# Patient Record
Sex: FEMALE | Race: WHITE | HISPANIC OR LATINO | Employment: PART TIME | URBAN - METROPOLITAN AREA
[De-identification: names, ages, dates, MRNs, and addresses within clinical notes are randomized per-mention and may not be internally consistent; named-entity substitution may affect disease eponyms.]

---

## 2022-11-18 ENCOUNTER — PREP FOR PROCEDURE (OUTPATIENT)
Dept: GASTROENTEROLOGY | Facility: CLINIC | Age: 62
End: 2022-11-18

## 2022-11-18 ENCOUNTER — TELEPHONE (OUTPATIENT)
Dept: GASTROENTEROLOGY | Facility: CLINIC | Age: 62
End: 2022-11-18

## 2022-11-18 ENCOUNTER — OFFICE VISIT (OUTPATIENT)
Dept: FAMILY MEDICINE CLINIC | Facility: CLINIC | Age: 62
End: 2022-11-18

## 2022-11-18 VITALS
SYSTOLIC BLOOD PRESSURE: 158 MMHG | HEIGHT: 64 IN | WEIGHT: 209.2 LBS | OXYGEN SATURATION: 98 % | BODY MASS INDEX: 35.71 KG/M2 | DIASTOLIC BLOOD PRESSURE: 100 MMHG | TEMPERATURE: 97.4 F

## 2022-11-18 DIAGNOSIS — F43.10 PTSD (POST-TRAUMATIC STRESS DISORDER): ICD-10-CM

## 2022-11-18 DIAGNOSIS — I10 PRIMARY HYPERTENSION: Primary | ICD-10-CM

## 2022-11-18 DIAGNOSIS — Z12.11 SCREENING FOR COLORECTAL CANCER: ICD-10-CM

## 2022-11-18 DIAGNOSIS — Z86.010 HISTORY OF COLON POLYPS: Primary | ICD-10-CM

## 2022-11-18 DIAGNOSIS — Z13.1 SCREENING FOR DIABETES MELLITUS: ICD-10-CM

## 2022-11-18 DIAGNOSIS — Z13.29 SCREENING FOR THYROID DISORDER: ICD-10-CM

## 2022-11-18 DIAGNOSIS — Z13.0 SCREENING FOR IRON DEFICIENCY ANEMIA: ICD-10-CM

## 2022-11-18 DIAGNOSIS — Z13.220 SCREENING FOR LIPID DISORDERS: ICD-10-CM

## 2022-11-18 DIAGNOSIS — Z12.39 ENCOUNTER FOR BREAST CANCER SCREENING OTHER THAN MAMMOGRAM: ICD-10-CM

## 2022-11-18 DIAGNOSIS — Z11.4 SCREENING FOR HIV (HUMAN IMMUNODEFICIENCY VIRUS): ICD-10-CM

## 2022-11-18 DIAGNOSIS — Z12.12 SCREENING FOR COLORECTAL CANCER: ICD-10-CM

## 2022-11-18 DIAGNOSIS — Z11.59 ENCOUNTER FOR HEPATITIS C SCREENING TEST FOR LOW RISK PATIENT: ICD-10-CM

## 2022-11-18 RX ORDER — LISINOPRIL 5 MG/1
5 TABLET ORAL DAILY
Qty: 90 TABLET | Refills: 1 | Status: SHIPPED | OUTPATIENT
Start: 2022-11-18

## 2022-11-18 RX ORDER — MULTIVITAMIN/IRON/FOLIC ACID 18MG-0.4MG
1 TABLET ORAL DAILY
COMMUNITY

## 2022-11-18 NOTE — TELEPHONE ENCOUNTER
Scheduled date of colonoscopy (as of today): 1/18/23    Physician performing colonoscopy: Dr Hermann Fuentes    Location of colonoscopy: [facility]

## 2022-11-18 NOTE — PATIENT INSTRUCTIONS
Hipertensión   LO QUE NECESITA SABER:   La hipertensión es la presión arterial filiberto  La presión arterial es la fuerza que ejerce la alexsander al [employer] contra las alves de las arterias  La hipertensión causa que waite presión arterial se eleve tanto que waite corazón se ve forzado a trabajar mucho más mitch que lo normal  Souris puede dañar waite corazón  Puede que no se conozca la causa de la hipertensión  Souris se denomina hipertensión esencial o primaria  La hipertensión causada por otra afección médica, tales inocencio la enfermedad renal, se denomina hipertensión secundaria  INSTRUCCIONES SOBRE EL FILIBERTO HOSPITALARIA:   Llame al [employer] de emergencias (911 en [address] ) o pídale a alguien que llame si:  Usted tiene dolor en el pecho  Tiene alguno de los siguientes signos de un ataque cardíaco:      Estrujamiento, presión o tensión en waite pecho    Usted también podría presentar alguno de los siguientes:     Malestar o dolor en waite espalda, russell, mandíbula, abdomen, o brazo    Falta de [employer] o vómitos    Desvanecimiento o sudor frío repentino    Usted se siente confundido o tiene dificultad para hablar  Repentinamente se siente aturdido o con dificultad para respirar  Regrese a la heather de emergencias si:  Usted tiene un mitch dolor de jc o pérdida de la visión  Usted tiene debilidad en un brazo o en katheryn pierna  Llame a waite médico o cardiólogo si:  Usted se siente mareado, confundido, somnoliento o inocencio si se fuera a desmayar  Usted ha estado tomando medicamento para la presión arterial renny todavía está en un nivel más elevado del que waite médico le indicó que debería estar  Usted tiene preguntas o inquietudes acerca de waite condición o cuidado  Medicamentos: Es posible que usted necesite alguno de los siguientes:  Los antihipertensivos podrían usarse para ayudar a disminuir la presión arterial  Varios tipos de medicamentos están disponibles   Waite médico elegirá medicamentos basados en el tipo de hipertensión que tiene  Es posible que necesite más de un tipo de [place] el medicamento exactamente inocencio indicado  Los diuréticos ayudan a eliminar el exceso de líquido que se acumula en el organismo  [place] ayudará a bajar waite presión arterial  Es posible que orine más seguido mientras eliana marlene medicamento  Los medicamentos para el colesterol ayudan a bajar los niveles de [place]  Un nivel bajo de colesterol ayuda a prevenir enfermedades cardíacas y facilita el control de la presión arterial     [place] dena medicamentos inocencio se le haya indicado  Consulte con waite médico si usted [place] que waite medicamento no le está ayudando o si presenta efectos secundarios  Infórmele si es alérgico a cualquier medicamento  Mantenga katheryn lista actualizada de los [place], las vitaminas y los productos herbales que eliana  Incluya los siguientes datos de los medicamentos: cantidad, frecuencia y motivo de administración  Traiga con usted la lista o los envases de las píldoras a dena citas de seguimiento  Lleve la lista de los medicamentos con usted en bobbi de katheryn emergencia  Acuda a dena consultas de control con waite médico o cardiólogo según le indicaron: Usted necesitará regresar para medir waite presión arterial y realizar otros exámenes de laboratorio  Anote dena preguntas para que se acuerde de hacerlas danica dena visitas  [place] de la hipertensión:      Derotha Armida presión arterial normal es 119/79 o inferior   Waite médico podría comprobar waite presión arterial solamente cada año si se mantiene en un nivel normal     Katheryn presión arterial elevada es de 120/79 a 129/79   A veces esto se llama prehipertensión  Waite médico puede sugerir cambios de estilo de joanne para ayudar a bajar la presión arterial a un nivel normal  Entonces él podría comprobar waite presión otra vez en 3 a 6 meses  La etapa 1 de la hipertensión es de 130/80  a 139/89    Waite médico podría recomendar cambios de estilo de joanne, medicamentos y controles cada 3 a 6 meses hasta que waite presión arterial esté controlada  La etapa 2 de la hipertensión es de 140/90 o mayor   Waite médico le recomendará cambios en el estilo de joanne y le indicará 2 clases de medicamentos para la hipertensión  También necesitará controlar waite presión arterial cada mes hasta que esté controlada  Maneje waite hipertensión:  Controle waite presión arterial en casa  Evite fumar, consumir cafeína y hacer ejercicio al menos 30 minutos antes de controlar waite presión arterial  Siéntese y descanse por 5 minutos antes de tomarse la presión arterial  Extienda waite brazo y apóyelo en katheryn superficie plana  Waite brazo debe estar a la misma altura que waite corazón  Siga las instrucciones que vienen con el monitor para la presión arterial  Controle waite presión arterial 2 veces, con diferencia de 1 minuto, antes de haylee waite medicamento por la mañana  También controle waite presión arterial antes de la [place]  Mantenga un registro de waite peso y llévelo con usted a las citas de control  Pregúntele a waite médico cuál debería ser waite presión arterial          Controle cualquier otra condición médica que usted tenga  Algunas condiciones médicas inocencio la diabetes pueden aumentar waite riesgo de hipertensión  [address] waite médico y tómese dena medicamentos según dichas instrucciones  Pregunte [employer]  Ciertos medicamentos pueden aumentar waite presión arterial  Los ejemplos incluyen las píldoras anticonceptivas orales, los descongestivos, los suplementos herbales y los [place], inocencio el ibuprofeno  Waite médico puede indicarle qué medicamentos son seguros para usted  Estos medicamentos incluyen los recetados y de [place]  Cambios de estilo de joanne que usted puede hacer para manejar la hipertensión: Waite médico puede recomendarle que trabaje con un equipo para controlar la hipertensión  El equipo puede incluir expertos médicos, inocencio un dietista, un fisioterapeuta o un terapeuta del comportamiento   Los miembros de waite clara pueden participar en la creación de cambios en el estilo de joanne  Limite el sodio (la sal) inocencio se le haya indicado  Demasiado sodio puede afectar el equilibrio de líquidos  Revise las etiquetas para buscar alimentos bajos en sodio o sin sal agregada  Algunos alimentos bajos en sodio utilizan sales de potasio para añadir sabor  Demasiado potasio también puede causar problemas de [place]  Waite médico le dirá qué cantidad de sodio y potasio es martin para el consumo en un día  Puede recomendarle que limite el sodio a 2,300 mg al día  Siga el plan de comidas recomendado por waite médico  Un dietista o médico puede darle más información sobre planes de bajo contenido de sodio o el plan de alimentación DASH (enfoques dietéticos para detener la hipertensión)  El plan DASH es bajo en sodio, azúcar procesada, grasas dañinas y grasas totales  Es alto en potasio, calcio y [place]  Estos se encuentran en las verduras, las frutas y los alimentos integrales  Manténgase físicamente activo danica todo el día  La actividad física, inocencio el ejercicio, puede ayudar a controlar waite presión arterial y waite peso  Jim actividad física por lo menos 30 minutos al día, la mayoría de los días de la [place]  Incluya katheryn actividad aeróbica, inocencio caminar o montar en bicicleta  Incluya también entrenamiento de fuerza al menos 2 veces por semana  Pau médicos pueden ayudarle a crear un plan de Carry Radha  [id number]  Es posible que esto contribuya a bajar waite presión arterial  Aprenda sobre formas de [place], inocencio respiración profunda o escuchar música  Limite el consumo de alcohol según le indicaron  El alcohol puede aumentar la presión arterial  Un trago equivale a 12 onzas de cerveza, 5 onzas de vino o 1 onza y ½ de licor  No fume  La nicotina y otros químicos en los cigarrillos y cigarros pueden aumentar waite presión arterial y también provocar daño al pulmón   [place] a waite médico si usted actualmente fuma y necesita ayuda para dejar de fumar  Los cigarrillos electrónicos o el tabaco sin humo igualmente contienen nicotina  Consulte con waite médico antes de [employer]  © Copyright [facility] 2022 Information is for End User's use only and may not be sold, redistributed or otherwise used for commercial purposes  All illustrations and images included in CareNotes® are the copyrighted property of A D A M , [employer]  or [address] [place] [address] es sólo para uso en educación  Waite intención no es darle un consejo médico sobre enfermedades o tratamientos  Colsulte con waite Ronalee Mon farmacéutico antes de seguir cualquier régimen médico para saber si es seguro y efectivo para usted
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
## 2022-11-20 PROBLEM — F43.10 PTSD (POST-TRAUMATIC STRESS DISORDER): Status: ACTIVE | Noted: 2022-11-20

## 2022-12-01 LAB
EXTERNAL HIV SCREEN: NORMAL
HBA1C MFR BLD HPLC: 5.6 %
HCV AB SER-ACNC: NEGATIVE

## 2022-12-07 ENCOUNTER — TELEPHONE (OUTPATIENT)
Dept: FAMILY MEDICINE CLINIC | Facility: CLINIC | Age: 62
End: 2022-12-07

## 2022-12-07 NOTE — TELEPHONE ENCOUNTER
Patient called to return Dr Jose Alicia phone call  Dr Jose Alicia is in with another patient, told patient that she would call back today but leaving at 12:00 PM or tomorrow morning to review lab results

## 2022-12-13 ENCOUNTER — TELEPHONE (OUTPATIENT)
Dept: FAMILY MEDICINE CLINIC | Facility: CLINIC | Age: 62
End: 2022-12-13

## 2022-12-13 NOTE — TELEPHONE ENCOUNTER
After review of your lab work, overall things look stable  Blood counts, kidney, liver and thyroid function are normal  The Hep C and HIV are negative  Hemoglobin A1C 5 6, no prediabetes or diabetes  Cholesterol levels were elevated, however, patient may continue with lifestyle modifications at this time  No need for medications

## 2022-12-21 ENCOUNTER — OFFICE VISIT (OUTPATIENT)
Dept: FAMILY MEDICINE CLINIC | Facility: CLINIC | Age: 62
End: 2022-12-21

## 2022-12-21 VITALS
SYSTOLIC BLOOD PRESSURE: 142 MMHG | HEART RATE: 78 BPM | WEIGHT: 207.4 LBS | DIASTOLIC BLOOD PRESSURE: 86 MMHG | BODY MASS INDEX: 35.41 KG/M2 | HEIGHT: 64 IN | TEMPERATURE: 97.7 F | OXYGEN SATURATION: 98 %

## 2022-12-21 DIAGNOSIS — I10 PRIMARY HYPERTENSION: Primary | ICD-10-CM

## 2022-12-21 DIAGNOSIS — Z23 ENCOUNTER FOR VACCINATION: ICD-10-CM

## 2022-12-21 DIAGNOSIS — L23.9 ALLERGIC DERMATITIS: ICD-10-CM

## 2022-12-21 RX ORDER — TRIAMCINOLONE ACETONIDE 0.25 MG/G
CREAM TOPICAL 2 TIMES DAILY
Qty: 30 G | Refills: 0 | Status: SHIPPED | OUTPATIENT
Start: 2022-12-21

## 2022-12-21 NOTE — PROGRESS NOTES
Assessment/Plan:    No problem-specific Assessment & Plan notes found for this encounter  Diagnoses and all orders for this visit:    Primary hypertension  - Patient reports compliance with lisinopril 5mg and has some improvement in BP, not yet at goal < 140/80  - PCP counseled on continued LM modification and importance of daily exercise ie 10-15 mins of walking     Encounter for vaccination  -     influenza vaccine, quadrivalent, recombinant, PF, 0 5 mL, for patients 18 yr+ (FLUBLOK)    Allergic dermatitis  - Patient with recent exposure x 2 weeks ago to flower in [place] after which she developed a rash and multiple hives  She did trial OTC steroid cream which helped her symptoms  - Patient to continue with triamcinolone for complete resolution of rash      - triamcinolone (KENALOG) 0 025 % cream; Apply topically 2 (two) times a day        Subjective:      Patient ID: Louise Henriquez is a 65 y o  female  Hypertension  This is a chronic problem  The current episode started more than 1 year ago  The problem is uncontrolled  Pertinent negatives include no blurred vision, chest pain, palpitations or peripheral edema  There are no associated agents to hypertension  Risk factors for coronary artery disease include dyslipidemia, obesity, sedentary lifestyle and post-menopausal state  Past treatments include ACE inhibitors  The current treatment provides moderate improvement  There are no compliance problems  There is no history of heart failure  There is no history of a thyroid problem  Rash  The current episode started 1 to 4 weeks ago  The problem has been gradually improving since onset  The affected locations include the chest  The problem is mild  The rash is characterized by blistering and itchiness  Associated with: Patient reports exposure to an unknown flower in [place] during which time she developed multiple blisters/hives and a rash, The rash first occurred at another residence   Pertinent negatives include no fever or sore throat  Past treatments include topical steroids  The treatment provided moderate relief  The following portions of the patient's history were reviewed and updated as appropriate: allergies, current medications, past family history, past medical history, past social history, past surgical history and problem list     Review of Systems   Constitutional: Negative for fever  HENT: Negative for sore throat  Eyes: Negative for blurred vision  Cardiovascular: Negative for chest pain and palpitations  Skin: Positive for rash  Objective:      /86 (BP Location: Left arm)   Pulse 78   Temp 97 7 °F (36 5 °C) (Tympanic)   Ht 5' 4" (1 626 m)   Wt 94 1 kg (207 lb 6 4 oz)   SpO2 98%   BMI 35 60 kg/m²          Physical Exam  Constitutional:       Appearance: She is well-developed  HENT:      Head: Normocephalic and atraumatic  Nose: Nose normal    Eyes:      Conjunctiva/sclera: Conjunctivae normal    Cardiovascular:      Rate and Rhythm: Normal rate and regular rhythm  Heart sounds: Normal heart sounds  Pulmonary:      Effort: Pulmonary effort is normal       Breath sounds: Normal breath sounds  Chest:      Comments: Multiple pin point blisters noted at the lateral border of the right axilla  Abdominal:      General: Bowel sounds are normal       Palpations: Abdomen is soft  Musculoskeletal:      Cervical back: Normal range of motion and neck supple  Skin:     General: Skin is warm and dry  Neurological:      Mental Status: She is alert and oriented to person, place, and time

## 2022-12-28 ENCOUNTER — TELEPHONE (OUTPATIENT)
Dept: FAMILY MEDICINE CLINIC | Facility: CLINIC | Age: 62
End: 2022-12-28

## 2022-12-28 NOTE — TELEPHONE ENCOUNTER
Kayleigh stopped by to inquire about the (disability?) paperwork she presented at her last visit  I did let her know that Dr usually takes about 5 days to review paperwork and give a date for patient   She wanted to make sure it was the correct form or if a different one was needed  Is there an update available I could share with patient?

## 2022-12-29 NOTE — TELEPHONE ENCOUNTER
Called patient after 's review of paperwork and relayed the information-- patient does not qualify for disability and she is not a [place] citizen  Patient stated that she understood

## 2023-01-06 ENCOUNTER — TELEPHONE (OUTPATIENT)
Dept: FAMILY MEDICINE CLINIC | Facility: CLINIC | Age: 63
End: 2023-01-06

## 2023-01-06 NOTE — TELEPHONE ENCOUNTER
Asked patient to provide a call back, so we can confirm which mental health counseling facility to send her records request to  Last Friday Mrs Luis Felipe Ceron stopped in and asked us to request records; however, she was unable to confirm at the time where to send the requests to  I asked her to confirm and give us a call back, but since we haven't heard back this week I reached out to follow up  Patient called back and mentioned she had forgotten to confirm the facility  She stated she had to recheck her documentation to get the correct information (name and phone or address)  She said she would stop in with the correct information

## 2023-01-09 ENCOUNTER — TELEPHONE (OUTPATIENT)
Dept: GASTROENTEROLOGY | Facility: CLINIC | Age: 63
End: 2023-01-09

## 2023-03-03 ENCOUNTER — TELEPHONE (OUTPATIENT)
Dept: FAMILY MEDICINE CLINIC | Facility: CLINIC | Age: 63
End: 2023-03-03

## 2023-03-03 NOTE — TELEPHONE ENCOUNTER
Called to reschedule 3/22 appointment due to appointment length error  Offered a 3/23 1 pm appointment or a different appointment that better fits her schedule   Requested a call back to reschedule appointment at [phone]

## 2023-03-21 NOTE — TELEPHONE ENCOUNTER
Called again to confirm or reschedule 3/22 appointment  However, patient did not answer, nor did spouse   Requested a call back to discuss appointment at [phone] option 1

## 2023-05-22 ENCOUNTER — OFFICE VISIT (OUTPATIENT)
Dept: FAMILY MEDICINE CLINIC | Facility: CLINIC | Age: 63
End: 2023-05-22

## 2023-05-22 VITALS
BODY MASS INDEX: 35.34 KG/M2 | WEIGHT: 207 LBS | SYSTOLIC BLOOD PRESSURE: 128 MMHG | HEIGHT: 64 IN | HEART RATE: 88 BPM | TEMPERATURE: 97.5 F | DIASTOLIC BLOOD PRESSURE: 80 MMHG | OXYGEN SATURATION: 97 %

## 2023-05-22 DIAGNOSIS — Z00.00 ANNUAL PHYSICAL EXAM: Primary | ICD-10-CM

## 2023-05-22 NOTE — PROGRESS NOTES
ADULT ANNUAL PHYSICAL  [facility] PRIMARY CARE    NAME: Melchor Fleischer  AGE: 59 y o  SEX: female  : 1960     DATE: 2023     Assessment and Plan:     Problem List Items Addressed This Visit    None  Visit Diagnoses     Annual physical exam    -  Primary  -She reports colonoscopy completed approximately 5 years ago PCP/office staff to reach out for pathology report  -Patient reports she completed cervical cancer screening in , care everywhere showing order on 2020  However full report is not available, will reach out to for complete results   -Patient due for mammography order has already been placed, patient to schedule as soon as possible    Relevant Orders    CBC and differential    Comprehensive metabolic panel    Lipid panel    Hemoglobin A1C          Immunizations and preventive care screenings were discussed with patient today  Appropriate education was printed on patient's after visit summary  Counseling:  Alcohol/drug use: discussed moderation in alcohol intake, the recommendations for healthy alcohol use, and avoidance of illicit drug use  Dental Health: discussed importance of regular tooth brushing, flossing, and dental visits  Injury prevention: discussed safety/seat belts, safety helmets, smoke detectors, carbon dioxide detectors, and smoking near bedding or upholstery  Sexual health: discussed sexually transmitted diseases, partner selection, use of condoms, avoidance of unintended pregnancy, and contraceptive alternatives  · Exercise: the importance of regular exercise/physical activity was discussed  Recommend exercise 3-5 times per week for at least 30 minutes  BMI Counseling: Body mass index is 35 53 kg/m²  The BMI is above normal  Nutrition recommendations include encouraging healthy choices of fruits and vegetables  Exercise recommendations include moderate physical activity 150 minutes/week   Rationale for BMI follow-up plan is due to patient being overweight or obese  Return in about 6 months (around 11/22/2023) for HTN recheck  Chief Complaint:     Chief Complaint   Patient presents with   • Physical Exam      History of Present Illness:     Adult Annual Physical   Patient here for a comprehensive physical exam  The patient reports no problems  Diet and Physical Activity  · Diet/Nutrition: Patient reports she is currently doing intermittent fasting  · Exercise: no formal exercise  Depression Screening  PHQ-2/9 Depression Screening         General Health  · Sleep: sleeps poorly and Patient reports she has experienced worsening anxiety over the last 1 to 2 weeks as she recently had a falling out with her daughter-in-law  PCP counseled patient on anxiety reducing techniques      · Hearing: normal - bilateral   · Vision: most recent eye exam <1 year ago and wears glasses  · Dental: no dental visits for >1 year and brushes teeth once daily  /GYN Health  · Patient is: postmenopausal  · Contraceptive method: N/A  Review of Systems:     Review of Systems   Constitutional: Negative for chills and fever  HENT: Negative for hearing loss  Eyes: Negative for visual disturbance  Respiratory: Negative for chest tightness and shortness of breath  Cardiovascular: Negative for chest pain and palpitations  Gastrointestinal: Negative for nausea and vomiting  Genitourinary: Negative for difficulty urinating and dysuria  Musculoskeletal: Negative for gait problem  Skin: Negative for rash  Neurological: Negative for light-headedness and headaches        Past Medical History:     Past Medical History:   Diagnosis Date   • Depression    • Hypertension       Past Surgical History:     Past Surgical History:   Procedure Laterality Date   • BREAST SURGERY      8 years ago, biopsy which was benign      Social History:     Social History     Socioeconomic History   • Marital status: "/[employer]     Spouse name: None   • Number of children: None   • Years of education: None   • Highest education level: None   Occupational History   • None   Tobacco Use   • Smoking status: Never   • Smokeless tobacco: Never   Vaping Use   • Vaping Use: Never used   Substance and Sexual Activity   • Alcohol use: Not Currently   • Drug use: Never   • Sexual activity: Yes     Partners: Male   Other Topics Concern   • None   Social History Narrative   • None     Social Determinants of Health     Financial Resource Strain: Not on file   Food Insecurity: Not on file   Transportation Needs: Not on file   Physical Activity: Not on file   Stress: Not on file   Social Connections: Not on file   Intimate Partner Violence: Not on file   Housing Stability: Not on file      Family History:     Family History   Problem Relation Age of Onset   • Depression Mother    • Depression Father    • Depression Brother    • Diabetes Brother       Current Medications:     Current Outpatient Medications   Medication Sig Dispense Refill   • lisinopril (ZESTRIL) 5 mg tablet Take 1 tablet (5 mg total) by mouth daily 90 tablet 1   • Multiple Vitamins-Minerals (EQ Complete Multivit Adult 50+) TABS Take 1 tablet by mouth daily     • triamcinolone (KENALOG) 0 025 % cream Apply topically 2 (two) times a day 30 g 0     No current facility-administered medications for this visit  Allergies:     No Known Allergies   Physical Exam:     /80   Pulse 88   Temp 97 5 °F (36 4 °C)   Ht 5' 4\" (1 626 m)   Wt 93 9 kg (207 lb)   SpO2 97%   BMI 35 53 kg/m²     Physical Exam  Constitutional:       Appearance: She is well-developed  HENT:      Head: Normocephalic and atraumatic  Right Ear: External ear normal       Left Ear: External ear normal       Mouth/Throat:      Mouth: Mucous membranes are moist       Pharynx: Oropharynx is clear     Eyes:      Conjunctiva/sclera: Conjunctivae normal    Cardiovascular:      Rate and Rhythm: " Normal rate and regular rhythm  Heart sounds: Normal heart sounds  Pulmonary:      Effort: Pulmonary effort is normal       Breath sounds: Normal breath sounds  Abdominal:      General: Bowel sounds are normal       Palpations: Abdomen is soft  Musculoskeletal:      Cervical back: Normal range of motion and neck supple  Skin:     General: Skin is warm and dry  Neurological:      Mental Status: She is alert and oriented to person, place, and time            MD Simón Aldrich [place] [id number] PRIMARY CARE

## 2023-05-23 LAB — HBA1C MFR BLD HPLC: 5.4 %

## 2023-06-02 ENCOUNTER — TELEPHONE (OUTPATIENT)
Dept: FAMILY MEDICINE CLINIC | Facility: CLINIC | Age: 63
End: 2023-06-02

## 2023-06-02 NOTE — TELEPHONE ENCOUNTER
----- Message from Thomas Jones MD sent at 6/1/2023  5:31 PM EDT -----  Regarding: Labs  Hi Ladies,    Can we please call patient and inform her of the below:    After review of your lab work, overall things look stable  Blood counts, kidney, and liver function are normal  =Cholesterol levels look great, as well as, the Hemoglobin A1c at 5 4 which indicates she does not have prediabetes or diabetes at this time  Please let me know if you have any questions or concerns        Thanks,  August Forward

## 2023-07-13 ENCOUNTER — TELEPHONE (OUTPATIENT)
Dept: GASTROENTEROLOGY | Facility: MEDICAL CENTER | Age: 63
End: 2023-07-13

## 2023-07-13 DIAGNOSIS — Z12.39 ENCOUNTER FOR BREAST CANCER SCREENING OTHER THAN MAMMOGRAM: ICD-10-CM

## 2023-07-13 NOTE — TELEPHONE ENCOUNTER
I called patient off of cancellation list. Patient failed OA - wants to see a provider first. Agreeable to September OV.

## 2023-07-18 NOTE — RESULT ENCOUNTER NOTE
Hi Ladies,    Can we please call patient and relay the following:    Good Morning Mrs. Nicole Slaughter,    Please note that your mammogram did not show any masses changes concerning for breast cancer at this time. You may continue routine screening in 1 year.     Steven Chaparro  ____________________________________________________________________

## 2023-07-21 ENCOUNTER — TELEPHONE (OUTPATIENT)
Dept: FAMILY MEDICINE CLINIC | Facility: CLINIC | Age: 63
End: 2023-07-21

## 2023-07-21 NOTE — TELEPHONE ENCOUNTER
Called patient's spouse for his results and asked if I could share patient's. He agreed. I relayed the information below and asked when her next appointment was, 11/21/23. Patient may stop in to schedule another appointment sooner. ----- Message from Arlene Eddy MD sent at 7/17/2023  9:31 PM EDT -----  Julissa Head Ladies,    Can we please call patient and relay the following:    Good Morning Mrs. Nuzhat Gayle,    Please note that your mammogram did not show any masses changes concerning for breast cancer at this time. You may continue routine screening in 1 year.     Monica Montanez  ____________________________________________________________________

## 2023-08-08 ENCOUNTER — TELEPHONE (OUTPATIENT)
Dept: ADMINISTRATIVE | Facility: OTHER | Age: 63
End: 2023-08-08

## 2023-08-08 NOTE — LETTER
Procedure Request Form: Colonoscopy      Date Requested: 23  Patient: Akron Milling  Patient : 1960   Referring Provider: Corky Lim, MD        Date of Procedure ______________________________       The above patient has informed us that they have completed their   most recent Colonoscopy at your facility. Please complete   this form and attach all corresponding procedure reports/results. Comments __________________________________________________________  ____________________________________________________________________  ____________________________________________________________________  ____________________________________________________________________    Facility Completing Procedure _________________________________________    Form Completed By (print name) _______________________________________      Signature __________________________________________________________      These reports are needed for  compliance. Please fax this completed form and a copy of the procedure report to our office located at [address] [place] [address] as soon as possible to Fax [phone] [place]: Phone [phone]    We thank you for your assistance in treating our mutual patient.

## 2023-08-08 NOTE — TELEPHONE ENCOUNTER
Upon review of the In Basket request we were able to locate, review, and update the patient chart as requested for CRC: Colonoscopy. Any additional questions or concerns should be emailed to the Practice Liaisons via the appropriate education email address, please do not reply via In Basket.     Thank you  Brionna Arroyo MA

## 2023-08-08 NOTE — TELEPHONE ENCOUNTER
----- Message from Lennox Delude sent at 8/7/2023  4:06 PM EDT -----  Regarding: Care Gap Request  08/07/23 4:06 PM    Hello, our patient attached above has had CRC: Colonoscopy completed/performed. Please assist in updating the patient chart by making an External outreach to Dr Amaya Boothe  facility located in [place] . The date of service is between 2016- 2018 .     Thank you,  Lennox Delude PG [place]

## 2023-08-08 NOTE — TELEPHONE ENCOUNTER
Upon review of the In Basket request and the patient's chart, initial outreach has been made via fax to facility. Please see Contacts section for details.      Thank you  Lisa Shipley MA

## 2023-10-18 ENCOUNTER — TELEPHONE (OUTPATIENT)
Dept: FAMILY MEDICINE CLINIC | Facility: CLINIC | Age: 63
End: 2023-10-18

## 2023-10-19 ENCOUNTER — OFFICE VISIT (OUTPATIENT)
Dept: FAMILY MEDICINE CLINIC | Facility: CLINIC | Age: 63
End: 2023-10-19
Payer: COMMERCIAL

## 2023-10-19 VITALS
WEIGHT: 206.4 LBS | HEIGHT: 64 IN | DIASTOLIC BLOOD PRESSURE: 92 MMHG | TEMPERATURE: 96.9 F | OXYGEN SATURATION: 98 % | HEART RATE: 74 BPM | BODY MASS INDEX: 35.24 KG/M2 | SYSTOLIC BLOOD PRESSURE: 126 MMHG

## 2023-10-19 DIAGNOSIS — Z12.4 CERVICAL CANCER SCREENING: ICD-10-CM

## 2023-10-19 DIAGNOSIS — R41.9 COGNITIVE COMPLAINTS: Primary | ICD-10-CM

## 2023-10-19 PROCEDURE — S0610 ANNUAL GYNECOLOGICAL EXAMINA: HCPCS | Performed by: FAMILY MEDICINE

## 2023-10-19 PROCEDURE — 99215 OFFICE O/P EST HI 40 MIN: CPT | Performed by: FAMILY MEDICINE

## 2023-10-19 PROCEDURE — G0145 SCR C/V CYTO,THINLAYER,RESCR: HCPCS | Performed by: FAMILY MEDICINE

## 2023-10-19 PROCEDURE — G0476 HPV COMBO ASSAY CA SCREEN: HCPCS | Performed by: FAMILY MEDICINE

## 2023-10-19 NOTE — PROGRESS NOTES
Assessment/Plan:    No problem-specific Assessment & Plan notes found for this encounter. Diagnoses and all orders for this visit:    Cognitive complaints  -   Patient reports concern over the last 2-3 years of memory changes and difficulty with learning new information resulting in some difficulties with IADLs  -PCP completed MoCA testing with results of 14/30, after further discussion with patient PCP has agreed to refer patient for further cognitive testing  - Ambulatory Referral to Neuropsychology; Future    Cervical cancer screening  -   Await pathology results  -Liquid-based pap, screening          Subjective:      Patient ID: Marielos Amador is a 59 y.o. female.     Patient presents to discuss concern of memory changes and difficulty learning new information, as well as, gynecologic exam  Patient reports that over the last 2 to 3 years she has been experiencing significant memory changes i.e. difficulty remembering conversations with family members, remembering instructions given to her, and completing some IADLs ie paying her bills  Patient denies ever getting lost after leaving the home or wandering her neighborhood  No family member present with patient to relay any concerns regarding cognition  Patient does report that she completed the ninth grade in the [place] but no further education thereafter  Patient reports concern regarding inability to process new information and requested further cognitive testing with PCP  Given the above, PCP has agreed to administer the [facility] Cognitive Assessment test today  PCP administered test in Georgian which is patient's primary language, overall school all 14/30 noted  PCP discussed results with patient and at this time would recommend follow-up with Neuropsychology to assess for underlying cognitive decline          The following portions of the patient's history were reviewed and updated as appropriate: allergies, current medications, past family history, past medical history, past social history, past surgical history, and problem list.    Review of Systems   Psychiatric/Behavioral:  Positive for decreased concentration. Memory changes         Objective:      /92   Pulse 74   Temp (!) 96.9 °F (36.1 °C)   Ht 5' 4" (1.626 m)   Wt 93.6 kg (206 lb 6.4 oz)   SpO2 98%   BMI 35.43 kg/m²          Physical Exam  Constitutional:       Appearance: Normal appearance. HENT:      Head: Normocephalic and atraumatic. Right Ear: External ear normal.      Left Ear: External ear normal.   Eyes:      Conjunctiva/sclera: Conjunctivae normal.   Genitourinary:     General: Normal vulva. Vagina: Normal.      Cervix: Normal.      Uterus: Normal.       Adnexa: Right adnexa normal and left adnexa normal.          Comments: Right laceration of vulva (from shaving per patient)  Neurological:      Mental Status: She is alert and oriented to person, place, and time.

## 2023-10-20 LAB
HPV HR 12 DNA CVX QL NAA+PROBE: NEGATIVE
HPV16 DNA CVX QL NAA+PROBE: NEGATIVE
HPV18 DNA CVX QL NAA+PROBE: NEGATIVE

## 2023-10-25 LAB
LAB AP GYN PRIMARY INTERPRETATION: NORMAL
Lab: NORMAL

## 2023-10-30 ENCOUNTER — TELEPHONE (OUTPATIENT)
Dept: FAMILY MEDICINE CLINIC | Facility: CLINIC | Age: 63
End: 2023-10-30

## 2023-10-30 NOTE — TELEPHONE ENCOUNTER
----- Message from Cecilia Knapp MD sent at 10/30/2023  9:13 AM EDT -----  Hi Ladies,    Can we please call patient and inform her of the below:    Good Morning Mrs. Kathie Vyas,    Please note that your papsmear was negative for any tissue changes concerning for cancer and your HPV panel was negative as well. You may continue with routine screening for HPV and papsmear every 5 years. Please reach out with any questions or concerns.     Joana Randolph  ___________________________________________________________________________

## 2023-10-31 ENCOUNTER — TELEPHONE (OUTPATIENT)
Dept: FAMILY MEDICINE CLINIC | Facility: CLINIC | Age: 63
End: 2023-10-31

## 2023-10-31 NOTE — TELEPHONE ENCOUNTER
----- Message from Dominick Daley MD sent at 10/30/2023  9:13 AM EDT -----  Hi Ladies,    Can we please call patient and inform her of the below:    Good Morning Mrs. Ingrid Jacome,    Please note that your papsmear was negative for any tissue changes concerning for cancer and your HPV panel was negative as well. You may continue with routine screening for HPV and papsmear every 5 years. Please reach out with any questions or concerns.     Josh Devine  ___________________________________________________________________________

## 2024-01-17 ENCOUNTER — TELEPHONE (OUTPATIENT)
Dept: PSYCHIATRY | Facility: CLINIC | Age: 64
End: 2024-01-17

## 2024-01-17 NOTE — TELEPHONE ENCOUNTER
"Reached out to patient in regards to Neuropsychology referral.    Unable to LVM for pt to contact intake department automated message comes on \"the person you are trying to reach is not receiving calls at this time\".   "

## 2024-01-18 ENCOUNTER — TELEPHONE (OUTPATIENT)
Dept: FAMILY MEDICINE CLINIC | Facility: CLINIC | Age: 64
End: 2024-01-18

## 2024-01-18 NOTE — TELEPHONE ENCOUNTER
Called patient multiple times to reschedule her visit for tomorrow 1/19/2024 Virtual, she did not answer and unable to leave a voicemail.

## 2024-01-24 NOTE — TELEPHONE ENCOUNTER
"Reached out to patient in regards to Neuropsychology referral.    Automated message states \"person you care trying to call cannot accept calls at this time\". Unable to leave VM.    "

## 2024-02-07 ENCOUNTER — TELEPHONE (OUTPATIENT)
Dept: FAMILY MEDICINE CLINIC | Facility: CLINIC | Age: 64
End: 2024-02-07

## 2024-02-07 ENCOUNTER — TELEPHONE (OUTPATIENT)
Dept: NEUROSURGERY | Facility: CLINIC | Age: 64
End: 2024-02-07

## 2024-02-07 DIAGNOSIS — R41.9 COGNITIVE COMPLAINTS: Primary | ICD-10-CM

## 2024-02-07 NOTE — TELEPHONE ENCOUNTER
Patient called, she asked if the doctor could make a referral for a neurologist because they called to make an appointment with a neuropsychology and they told her that they do not accept referrals from a primary doctor.

## 2024-02-07 NOTE — TELEPHONE ENCOUNTER
Called patient to let her know that the referral has been placed. She didn't answer I left a voicemail.

## 2024-03-17 NOTE — TELEPHONE ENCOUNTER
07/13/23  Screened by: Elias Aden    Referring Provider Anthony    Pre- Screening: There is no height or weight on file to calculate BMI. Has patient been referred for a routine screening Colonoscopy? yes  Is the patient between 43-73 years old? yes      Previous Colonoscopy yes   If yes:    Date: 5 years ago    Facility: Unknown    Reason: Screening      SCHEDULING STAFF: If the patient is between 39yrs-51yrs, please advise patient to confirm benefits/coverage with their insurance company for a routine screening colonoscopy, some insurance carriers will only cover at [address] [place] [address] or older. If the patient is over 66years old, please schedule an office visit. Does the patient want to see a Gastroenterologist prior to their procedure OR are they having any GI symptoms? yes    Has the patient been hospitalized or had abdominal surgery in the past 6 months? no    Does the patient use supplemental oxygen? no    Does the patient take Coumadin, Lovenox, Plavix, Elliquis, Xarelto, or other blood thinning medication? no    Has the patient had a stroke, cardiac event, or stent placed in the past year? no    SCHEDULING STAFF: If patient answers NO to above questions, then schedule procedure. If patient answers YES to above questions, then schedule office appointment. If patient is between 45yrs - 49yrs, please advise patient that we will have to confirm benefits & coverage with their insurance company for a routine screening colonoscopy. 1 person assist

## 2024-04-16 ENCOUNTER — OFFICE VISIT (OUTPATIENT)
Dept: URGENT CARE | Facility: CLINIC | Age: 64
End: 2024-04-16
Payer: COMMERCIAL

## 2024-04-16 ENCOUNTER — APPOINTMENT (OUTPATIENT)
Dept: RADIOLOGY | Facility: CLINIC | Age: 64
End: 2024-04-16
Payer: COMMERCIAL

## 2024-04-16 VITALS
OXYGEN SATURATION: 98 % | RESPIRATION RATE: 18 BRPM | HEART RATE: 58 BPM | TEMPERATURE: 97.6 F | DIASTOLIC BLOOD PRESSURE: 85 MMHG | SYSTOLIC BLOOD PRESSURE: 142 MMHG

## 2024-04-16 DIAGNOSIS — M25.572 ARTHRALGIA OF ANKLE, LEFT: Primary | ICD-10-CM

## 2024-04-16 DIAGNOSIS — M25.472 LEFT ANKLE SWELLING: ICD-10-CM

## 2024-04-16 PROCEDURE — 73610 X-RAY EXAM OF ANKLE: CPT

## 2024-04-16 PROCEDURE — 99203 OFFICE O/P NEW LOW 30 MIN: CPT | Performed by: PHYSICIAN ASSISTANT

## 2024-04-16 RX ORDER — MELOXICAM 15 MG/1
15 TABLET ORAL DAILY
Qty: 10 TABLET | Refills: 0 | Status: SHIPPED | OUTPATIENT
Start: 2024-04-16 | End: 2024-04-26

## 2024-04-16 NOTE — PROGRESS NOTES
[facility]        NAME: Kayleigh Harry is a 64 y.o. female  : 1960    MRN: [id number]  DATE: 2024  TIME: 3:12 PM    Assessment and Plan   Arthralgia of ankle, left [M25.572]  1. Arthralgia of ankle, left  meloxicam (Mobic) 15 mg tablet      2. Left ankle swelling  XR ankle 3+ vw left            Patient Instructions     Patient Instructions   Artralgia   LO QUE NECESITA SABER:   La artralgia es dolor en katheryn o más articulaciones, renny sin inflamación. El dolor podría ser de corta duración y mejorar dentro de 6 a 8 semanas. La artralgia puede ser katheryn señal temprana de artritis. La artralgia puede ser causada por katheryn condición médica inocencio un trastorno hormonal o un tumor. Además podría ser la consecuencia de katheryn infección o lesión.  INSTRUCCIONES SOBRE EL FILIBERTO HOSPITALARIA:   Medicamentos: A usted le pueden  prescribir los siguientes medicamentos:  Acetaminofén gerard el dolor. Pregunte la cantidad y la frecuencia con que debe tomarlos. Siga las indicaciones. El acetaminofén puede causar daño en el hígado cuando no se eliana de forma correcta.    NAT disminuyen el dolor y previenen la inflamación. Consulte con waite médico cuál medicamento es el adecuado para usted. Pregunte cuánto haylee y cuándo. Tómelos inocencio se le indique. Cuando no se michael de la manera indicada, los medicamentos antiinflamatorios no esteroides pueden causar sangrado estomacal y problemas renales.    La crema para el alivio del dolor gerard el dolor. Usela según las indicaciones dadas.    [place] dena medicamentos inocencio se le haya indicado. Consulte con waite médico si usted shelby que waite medicamento no le está ayudando o si presenta efectos secundarios. Infórmele al médico si usted es alérgico a algún medicamento. Mantenga katheryn lista actualizada de los medicamentos, las vitaminas y los productos herbales que eliana. Incluya los siguientes datos de los medicamentos: cantidad, frecuencia y motivo de administración. Traiga con usted la  lista o los envases de las píldoras a dena citas de seguimiento. Lleve la lista de los medicamentos con usted en bobbi de katheryn emergencia.    Jim katheryn rinku de control con waite médico o especialista inocencio se lo indicaron: Anote dena preguntas para que se acuerde de hacerlas danica dena visitas.  Cuidados personales:  Aplique calor para ayudar a reducir el dolor. Use katheryn compresa o envoltura caliente. Aplíquese calor de 20 a 30 minutos cada 2 horas danica los días que le indiquen.    Descanse lo más posible. Evite actividades que causan dolor en las articulaciones.    Aplique hielo para ayudar a bajar la inflamación y el dolor. El hielo también puede contribuir a evitar el daño de los tejidos. Use katheryn compresa de hielo o ponga hielo triturado en katheryn bolsa de plástico. Cúbrala con katheryn toalla y póngasela en la articulación adolorida cada hora danica 15 o 20 minutos o según se le haya indicado.    [place] la articulación con katheryn férula o envoltura elástica según indicaciones.    Eleve la articulación de manera que quede por encima del nivel de waite corazón tan seguido inocencio pueda para ayudar a reducir la inflamación y el dolor. Use almohadas o cobijas dobladas para elevar la articulación de forma cómoda.    Pierda peso si tiene sobrepeso. El peso extra puede ejercer presión sobre dena articulaciones y causarle más dolor. Consulte con waite médico cuánto debería pesar. También pídale que le ayude a diseñar un buen plan de pérdida de peso.    El ejercicio con regularidad para ayudar a mejorar el movimiento de las articulaciones y disminuir el dolor. Pida más información acerca de un plan de ejercicio adecuado para usted. Los ejercicios de bajo impacto pueden ayudar a quitar algo de la presión en las articulaciones. Algunos ejemplos de ejercicios de bajo impacto son caminar, nadar y practicar aeróbicos acuáticos.    Fisioterapia: Un fisioterapeuta le puede enseñar ejercicios para ayudarle a mejorar el movimiento y la fuerza, y para  disminuir el dolor. Pregúntele a waite médico si la fisioterapia es apropiada para usted.  Comuníquese con waite especialista o médico sí:  Tiene fiebre.    Usted continúa sintiendo dolor en las articulaciones y el dolor no se le gerard con calor, hielo o medicamento.    Usted tiene dolor e inflamación alrededor de la articulación.    Usted tiene preguntas o inquietudes acerca de waite condición o cuidado.    Regrese a la heather de emergencias si:  Usted de repente siente un dolor severo cuando mueve la articulación.    Usted tiene fiebre y escalofríos lavell.    Usted no puede  la articulación.    Usted pierde sensibilidad en el lado del cuerpo donde está la articulación adolorida.    © Copyright [employer] 2023 Information is for End User's use only and may not be sold, redistributed or otherwise used for commercial purposes.  Esta información es sólo para uso en educación. Waite intención no es darle un consejo médico sobre enfermedades o tratamientos. Colsulte con waite médico, enfermera o farmacéutico antes de seguir cualquier régimen médico para saber si es seguro y efectivo para usted.        Follow up with PCP in 3-5 days.  Proceed to  ER if symptoms worsen.    Chief Complaint     Chief Complaint   Patient presents with    Pain     Lateral aspect or left ankle onset 2 months ago worse last night not sure what happen         History of Present Illness       Patient presents the clinic for swelling of the left has been reoccurring for approximately 2 months.  She denies any specific injury to her left ankle.  She is Ivorian-speaking and her son is interpreting for her today.  She states that the pain is intermittent and seems to be worse with ambulation.  She does have associated swelling but denies any redness or heat.  She did not have any known injury.        Review of Systems   Review of Systems   Musculoskeletal:  Positive for arthralgias and joint swelling. Negative for myalgias, neck pain and neck stiffness.   Skin:   Negative for color change and rash.         Current Medications       Current Outpatient Medications:     meloxicam (Mobic) 15 mg tablet, Take 1 tablet (15 mg total) by mouth daily for 10 days, Disp: 10 tablet, Rfl: 0    Current Allergies     Allergies as of 04/16/2024    (No Known Allergies)            The following portions of the patient's history were reviewed and updated as appropriate: allergies, current medications, past family history, past medical history, past social history, past surgical history and problem list.     Past Medical History:   Diagnosis Date    Depression     Hypertension        Past Surgical History:   Procedure Laterality Date    BREAST SURGERY      8 years ago, biopsy which was benign       Family History   Problem Relation Age of Onset    Depression Mother     Depression Father     Depression Brother     Diabetes Brother          Medications have been verified.        Objective   /85   Pulse 58   Temp 97.6 °F (36.4 °C)   Resp 18   SpO2 98%        Physical Exam     Physical Exam  Musculoskeletal:      Left knee: No swelling.      Left lower leg: No swelling.      Left ankle: Swelling present. Tenderness present. Decreased range of motion.      Left Achilles Tendon: No tenderness or defects. Elam's test negative.      Left foot: Normal range of motion. No swelling, bony tenderness or crepitus.        Legs:       Comments: -There is edema noted in the left lateral ankle.  The patient does have decreased range of motion of flexion, extension and rotation of the left ankle.           -I personally interpreted the x-ray and reviewed it with the patient.  There is no acute fracture.  He does have a moderate amount of arthritis in the ankle.  Also has significant amount of spurring in her calcaneus  -Will treat with Mobic for pain relief.  I suggest follow-up with PCP as she will need a podiatrist if symptoms do not improve.

## 2024-04-16 NOTE — PATIENT INSTRUCTIONS
Artralgia   LO QUE NECESITA SABER:   La artralgia es dolor en katheryn o más articulaciones, renny sin inflamación. El dolor podría ser de corta duración y mejorar dentro de 6 a 8 semanas. La artralgia puede ser katheryn señal temprana de artritis. La artralgia puede ser causada por katheryn condición médica inocencio un trastorno hormonal o un tumor. Además podría ser la consecuencia de katheryn infección o lesión.  INSTRUCCIONES SOBRE EL FILIBERTO HOSPITALARIA:   Medicamentos: A usted le pueden  prescribir los siguientes medicamentos:  Acetaminofén gerard el dolor. Pregunte la cantidad y la frecuencia con que debe tomarlos. Siga las indicaciones. El acetaminofén puede causar daño en el hígado cuando no se eliana de forma correcta.    NAT disminuyen el dolor y previenen la inflamación. Consulte con waite médico cuál medicamento es el adecuado para usted. Pregunte cuánto haylee y cuándo. Tómelos inocencio se le indique. Cuando no se michael de la manera indicada, los medicamentos antiinflamatorios no esteroides pueden causar sangrado estomacal y problemas renales.    La crema para el alivio del dolor gerard el dolor. Usela según las indicaciones dadas.    [place] dena medicamentos inocencio se le haya indicado. Consulte con waite médico si usted shelby que waite medicamento no le está ayudando o si presenta efectos secundarios. Infórmele al médico si usted es alérgico a algún medicamento. Mantenga katheryn lista actualizada de los medicamentos, las vitaminas y los productos herbales que eliana. Incluya los siguientes datos de los medicamentos: cantidad, frecuencia y motivo de administración. Traiga con usted la lista o los envases de las píldoras a dena citas de seguimiento. Lleve la lista de los medicamentos con usted en bobbi de katheryn emergencia.    Jim katheryn rinku de control con waite médico o especialista inocencio se lo indicaron: Anote dena preguntas para que se acuerde de hacerlas danica dena visitas.  Cuidados personales:  Aplique calor para ayudar a reducir el dolor. Use katheryn compresa o  envoltura caliente. Aplíquese calor de 20 a 30 minutos cada 2 horas danica los días que le indiquen.    Descanse lo más posible. Evite actividades que causan dolor en las articulaciones.    Aplique hielo para ayudar a bajar la inflamación y el dolor. El hielo también puede contribuir a evitar el daño de los tejidos. Use katheryn compresa de hielo o ponga hielo triturado en katheryn bolsa de plástico. Cúbrala con katheryn toalla y póngasela en la articulación adolorida cada hora danica 15 o 20 minutos o según se le haya indicado.    [place] la articulación con katheryn férula o envoltura elástica según indicaciones.    Eleve la articulación de manera que quede por encima del nivel de waite corazón tan seguido inocencio pueda para ayudar a reducir la inflamación y el dolor. Use almohadas o cobijas dobladas para elevar la articulación de forma cómoda.    Pierda peso si tiene sobrepeso. El peso extra puede ejercer presión sobre dena articulaciones y causarle más dolor. Consulte con waite médico cuánto debería pesar. También pídale que le ayude a diseñar un buen plan de pérdida de peso.    El ejercicio con regularidad para ayudar a mejorar el movimiento de las articulaciones y disminuir el dolor. Pida más información acerca de un plan de ejercicio adecuado para usted. Los ejercicios de bajo impacto pueden ayudar a quitar algo de la presión en las articulaciones. Algunos ejemplos de ejercicios de bajo impacto son caminar, nadar y practicar aeróbicos acuáticos.    Fisioterapia: Un fisioterapeuta le puede enseñar ejercicios para ayudarle a mejorar el movimiento y la fuerza, y para disminuir el dolor. Pregúntele a waite médico si la fisioterapia es apropiada para usted.  Comuníquese con waite especialista o médico sí:  Tiene fiebre.    Usted continúa sintiendo dolor en las articulaciones y el dolor no se le gerard con calor, hielo o medicamento.    Usted tiene dolor e inflamación alrededor de la articulación.    Usted tiene preguntas o inquietudes acerca de waite  condición o cuidado.    Regrese a la heather de emergencias si:  Usted de repente siente un dolor severo cuando mueve la articulación.    Usted tiene fiebre y escalofríos lavell.    Usted no puede  la articulación.    Usted pierde sensibilidad en el lado del cuerpo donde está la articulación adolorida.    © Copyright [employer] 2023 Information is for End User's use only and may not be sold, redistributed or otherwise used for commercial purposes.  Esta información es sólo para uso en educación. Waite intención no es darle un consejo médico sobre enfermedades o tratamientos. Colsulte con waite médico, enfermera o farmacéutico antes de seguir cualquier régimen médico para saber si es seguro y efectivo para usted.

## 2024-05-17 ENCOUNTER — OFFICE VISIT (OUTPATIENT)
Dept: FAMILY MEDICINE CLINIC | Facility: CLINIC | Age: 64
End: 2024-05-17
Payer: COMMERCIAL

## 2024-05-17 VITALS
OXYGEN SATURATION: 98 % | HEART RATE: 74 BPM | SYSTOLIC BLOOD PRESSURE: 132 MMHG | BODY MASS INDEX: 35.27 KG/M2 | TEMPERATURE: 96.4 F | WEIGHT: 206.6 LBS | HEIGHT: 64 IN | DIASTOLIC BLOOD PRESSURE: 86 MMHG

## 2024-05-17 DIAGNOSIS — F33.1 MODERATE EPISODE OF RECURRENT MAJOR DEPRESSIVE DISORDER (HCC): ICD-10-CM

## 2024-05-17 DIAGNOSIS — M77.32 CALCANEAL SPUR, LEFT: Primary | ICD-10-CM

## 2024-05-17 PROCEDURE — 99214 OFFICE O/P EST MOD 30 MIN: CPT | Performed by: FAMILY MEDICINE

## 2024-05-17 RX ORDER — MELOXICAM 15 MG/1
15 TABLET ORAL DAILY
Qty: 30 TABLET | Refills: 1 | Status: SHIPPED | OUTPATIENT
Start: 2024-05-17 | End: 2024-07-16

## 2024-05-17 NOTE — PROGRESS NOTES
Ambulatory Visit  Name: Kayleigh Harry      : 1960      MRN: [id number]  Encounter Provider: Christianne Cruz MD  Encounter Date: 2024   Encounter department: [facility]    Assessment & Plan   1. Calcaneal spur, left  -   Patient presents for follow-up of left foot pain now occurring x 3 months, was seen 1 month ago at the urgent care and prescribed Mobic which patient reports did help symptoms mildly  -X-ray imaging showing large calcaneal spur on the left for which PCP discussed with patient further evaluation with podiatry is recommended at this time  -  Ambulatory Referral to Podiatry; Future  -     meloxicam (Mobic) 15 mg tablet; Take 1 tablet (15 mg total) by mouth daily    2.  Moderate episode of recurrent major depressive disorder (HCC)  -     Patient presents with PHQ-9 12 moderate depression in setting of continued marital discord.  PCP discussed at length with patient options of pharmacotherapy versus CBT.  However, patient reports that she has been seeking counseling within her spiritual [employer] and would like to proceed with that.  Patient denies any SI/HI at this time.  Patient will reach out if any concerns arise or if she would like referral for counseling in the future.    Depression Screening and Follow-up Plan: Patient's depression screening was positive with a PHQ-2 score of 4. Their PHQ-9 score was 12. Patient assessed for underlying major depression. Brief counseling provided and recommend additional follow-up/re-evaluation next office visit.       PHQ-2/9 Depression Screening    Little interest or pleasure in doing things: 1 - several days  Feeling down, depressed, or hopeless: 3 - nearly every day  Trouble falling or staying asleep, or sleeping too much: 3 - nearly every day  Feeling tired or having little energy: 3 - nearly every day  Poor appetite or overeatin - several days  Feeling bad about yourself - or that you are a failure or have  "let yourself or your family down: 1 - several days  Trouble concentrating on things, such as reading the newspaper or watching television: 0 - not at all  Moving or speaking so slowly that other people could have noticed. Or the opposite - being so fidgety or restless that you have been moving around a lot more than usual: 0 - not at all  Thoughts that you would be better off dead, or of hurting yourself in some way: 0 - not at all  PHQ-2 Score: 4  PHQ-2 Interpretation: POSITIVE depression screen  PHQ-9 Score: 12  PHQ-9 Interpretation: Moderate depression          History of Present Illness     Patient presents for follow-up of left foot pain which she reports started approximately 2 months ago, she was seen at urgent care x 1 month ago   Patient denies any falls, injury or trauma to the left foot she was recently away on vacation in the [place] [facility] [place]  Patient does report that she feels waxing and waning episodes of pain but associates the pain with longer episodes of standing/walking  PCP reviewed x-ray imaging which was obtained about 1 month ago at urgent care showing large calcaneal spurs on the left.  PCP reviewed imaging with patient and discussed that follow-up with podiatry for further management was appropriate at this time.          Depression  This is a recurrent problem. The current episode started more than 1 year ago. The problem occurs daily. The problem has been waxing and waning. The symptoms are aggravated by stress (Patient reports that she has been [place] the [place] which has been helpful.  She reports that the majority of her depression is due to marital discord.).           Review of Systems   Psychiatric/Behavioral:  Positive for depression.      Pertinent Medical History         Objective     /86   Pulse 74   Temp (!) 96.4 °F (35.8 °C)   Ht 5' 4\" (1.626 m)   Wt 93.7 kg (206 lb 9.6 oz)   SpO2 98%   BMI 35.46 kg/m²     Physical Exam  Constitutional:       " Appearance: She is well-developed.   HENT:      Head: Normocephalic and atraumatic.   Eyes:      Conjunctiva/sclera: Conjunctivae normal.   Cardiovascular:      Rate and Rhythm: Normal rate and regular rhythm.      Heart sounds: Normal heart sounds.   Pulmonary:      Effort: Pulmonary effort is normal.      Breath sounds: Normal breath sounds.   Musculoskeletal:      Cervical back: Normal range of motion and neck supple.   Skin:     General: Skin is warm and dry.   Neurological:      Mental Status: She is alert and oriented to person, place, and time.       Administrative Statements   I have spent a total time of 35 minutes on 05/17/24 In caring for this patient including Diagnostic results, Prognosis, Instructions for management, and Documenting in the medical record.

## 2024-08-20 ENCOUNTER — TELEPHONE (OUTPATIENT)
Dept: FAMILY MEDICINE CLINIC | Facility: CLINIC | Age: 64
End: 2024-08-20

## 2024-08-20 ENCOUNTER — OFFICE VISIT (OUTPATIENT)
Dept: FAMILY MEDICINE CLINIC | Facility: CLINIC | Age: 64
End: 2024-08-20
Payer: COMMERCIAL

## 2024-08-20 ENCOUNTER — TELEPHONE (OUTPATIENT)
Age: 64
End: 2024-08-20

## 2024-08-20 VITALS
DIASTOLIC BLOOD PRESSURE: 86 MMHG | TEMPERATURE: 96.9 F | WEIGHT: 207.2 LBS | HEIGHT: 64 IN | HEART RATE: 82 BPM | SYSTOLIC BLOOD PRESSURE: 154 MMHG | OXYGEN SATURATION: 97 % | BODY MASS INDEX: 35.37 KG/M2

## 2024-08-20 DIAGNOSIS — G47.00 INSOMNIA, UNSPECIFIED TYPE: ICD-10-CM

## 2024-08-20 DIAGNOSIS — R06.83 SNORING: ICD-10-CM

## 2024-08-20 DIAGNOSIS — R03.0 ELEVATED BP WITHOUT DIAGNOSIS OF HYPERTENSION: ICD-10-CM

## 2024-08-20 DIAGNOSIS — F41.9 ANXIETY: ICD-10-CM

## 2024-08-20 DIAGNOSIS — Z13.6 SCREENING FOR CARDIOVASCULAR CONDITION: ICD-10-CM

## 2024-08-20 DIAGNOSIS — Z12.31 ENCOUNTER FOR SCREENING MAMMOGRAM FOR BREAST CANCER: ICD-10-CM

## 2024-08-20 DIAGNOSIS — G89.29 CHRONIC DENTAL PAIN: Primary | ICD-10-CM

## 2024-08-20 DIAGNOSIS — Z00.00 ANNUAL PHYSICAL EXAM: Primary | ICD-10-CM

## 2024-08-20 DIAGNOSIS — R00.2 PALPITATIONS: ICD-10-CM

## 2024-08-20 DIAGNOSIS — K08.9 CHRONIC DENTAL PAIN: Primary | ICD-10-CM

## 2024-08-20 PROCEDURE — 99213 OFFICE O/P EST LOW 20 MIN: CPT | Performed by: FAMILY MEDICINE

## 2024-08-20 PROCEDURE — 99396 PREV VISIT EST AGE 40-64: CPT | Performed by: FAMILY MEDICINE

## 2024-08-20 RX ORDER — CHLORHEXIDINE GLUCONATE ORAL RINSE 1.2 MG/ML
SOLUTION DENTAL
COMMUNITY
Start: 2024-07-01

## 2024-08-20 RX ORDER — UREA 40 %
1 CREAM (GRAM) TOPICAL 2 TIMES DAILY
COMMUNITY
Start: 2024-08-06

## 2024-08-20 NOTE — TELEPHONE ENCOUNTER
Patient stopped into the office and requested a referral to the dentist .   was with and expressed that the referral has to be specific to needing a root canal.  I then suggested that patient call the insurance.  Patient and  called the insurance.   said no referral is needed ,but maybe a clearance from PCP is needed.  After the call was completed I then suggested that Patient call the Dentist and get a form for PCP to fill out.     Patient then said they will go to the dentist office because they never answer their phone.

## 2024-08-20 NOTE — PROGRESS NOTES
Adult Annual Physical  Name: Kayleigh Harry      : 1960      MRN: [id number]  Encounter Provider: Shy Alvarez MD  Encounter Date: 2024   Encounter department: Replaced by [facility]    Assessment & Plan   1. Annual physical exam  2. 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 Palpitations  Comments:  -Patient reports on and off palpitations lately  -She states that preparing for citizenship exam has been very hard and has been causing a lot of stress  -No associated chest pain, SOB, LOC  -Based on chart review, it appears that patient has had several visits for palpitations all the way back until  and prior EKGs were normal with few PVCs  -Differential include JEAN, panic attacks, hypothyroidism, anemia.  -Less likely cardiac etiology  -We will obtain lab work  -We will refer to psychotherapy for suspected anxiety versus panic attack  -Follow-up in 3 weeks  Orders:  -     TSH, 3rd generation; Future; Expected date: 2024    4. BMI 35.0-35.9,adult  Comments:  -Counseled on healthy diet and exercise  -Patient reports loud snoring, daytime tiredness, witnessed sleep apnea  -STOP-BANG 5, neck circumference less than 40 cm  -We will refer to sleep medicine for sleep apnea assessment  Orders:  -     Hemoglobin A1C; Future; Expected date: 2024  -     Comprehensive metabolic panel; Future; Expected date: 2024  -     TSH, 3rd generation; Future; Expected date: 2024  -     T4; Future  -     Ambulatory Referral to Sleep Medicine; Future  5. Insomnia, unspecified type  Comments:  Patient reports that she wakes up multiple times at nights lately  Reports increased stress from preparing for citizenship exam  STOP-BANG 5  Suspect her insomnia is secondary to the stressor versus sleep  apnea  Referral to sleep medicine placed  Orders:  -     Ambulatory Referral to Sleep Medicine; Future  6. Snoring  -     Ambulatory Referral to Sleep Medicine; Future  7. Anxiety  Comments:-  -Patient reports increased anxiety lately due to the stress from preparing for citizenship exam  -She states that retaining all that information is very hard  -We will refer her to psych services for psychotherapy  -Follow-up in 3 weeks  Orders:  -     Ambulatory referral to Psych Services; Future  8. Screening for cardiovascular condition  -     Hemoglobin A1C; Future; Expected date: 08/20/2024  -     Comprehensive metabolic panel; Future; Expected date: 08/20/2024  -     CBC and differential; Future; Expected date: 08/20/2024  -     TSH, 3rd generation; Future; Expected date: 08/20/2024  -     Lipid panel; Future; Expected date: 08/20/2024  -     T4; Future  9. Encounter for screening mammogram for breast cancer  -     Mammo screening bilateral w 3d & cad; Future    Immunizations and preventive care screenings were discussed with patient today. Appropriate education was printed on patient's after visit summary.    Counseling:  Alcohol/drug use: discussed moderation in alcohol intake, the recommendations for healthy alcohol use, and avoidance of illicit drug use.  Dental Health: discussed importance of regular tooth brushing, flossing, and dental visits.  Sexual health: discussed sexually transmitted diseases, partner selection, use of condoms, avoidance of unintended pregnancy, and contraceptive alternatives.  Exercise: the importance of regular exercise/physical activity was discussed. Recommend exercise 3-5 times per week for at least 30 minutes.          History of Present Illness   Patient is a 64-year-old female who presents to the office today for annual physical.  She states that she has been under a lot of stress due to preparation for citizenship exam.  She states that she is finding it very hard to retain all her  information and that has been causing a lot of stress lately.  She states that her sleep has been very disturbed.  She reports that her  has noticed her snore loudly and stops breathing at times.  She states that she has been feeling palpitations on and off especially during stressful situations.  She states that her palpitations last few seconds and go away once she calms down.  Denies chest pain, LOC, SOB.  She states that her symptoms mainly occur during stressful situations.        Adult Annual Physical:  Patient presents for annual physical.     Diet and Physical Activity:  - Diet/Nutrition: poor diet, limited junk food, adequate fiber intake and adequate whole grain intake. Skips meals  - Exercise: no formal exercise.    General Health:  - Sleep: sleeps poorly and snores loudly. Sleeps poorly due to stress about citizenship exam  - Hearing: normal hearing bilateral ears.  - Vision: wears glasses, vision problems and most recent eye exam > 1 year ago. Reading glasses.  - Dental: no dental visits for > 1 year and brushes teeth twice daily.    /GYN Health:  - Follows with GYN: yes.   - Menopause: postmenopausal.   - History of STDs: no    Advanced Care Planning:  - Has an advanced directive?: no    - Has a durable medical POA?: no    - ACP document given to patient?: no      Review of Systems   Constitutional:  Negative for chills and fever.   HENT:  Negative for ear pain and sore throat.    Eyes:  Negative for pain and visual disturbance.   Respiratory:  Negative for cough and shortness of breath.    Cardiovascular:  Positive for palpitations (on and off during stressful situations). Negative for chest pain.   Gastrointestinal:  Negative for abdominal pain and vomiting.   Genitourinary:  Negative for dysuria and hematuria.   Musculoskeletal:  Negative for arthralgias and back pain.   Skin:  Negative for color change and rash.   Neurological:  Negative for seizures and syncope.   Psychiatric/Behavioral:   "Positive for sleep disturbance.    All other systems reviewed and are negative.    Medical History Reviewed by provider this encounter:  Tobacco  Allergies  Meds  Problems  Med Hx  Surg Hx  Fam Hx         Objective     /86 (BP Location: Right arm)   Pulse 82   Temp (!) 96.9 °F (36.1 °C) (Tympanic)   Ht 5' 4\" (1.626 m)   Wt 94 kg (207 lb 3.2 oz)   SpO2 97%   BMI 35.57 kg/m²     Physical Exam  Vitals and nursing note reviewed.   Constitutional:       General: She is not in acute distress.     Appearance: She is well-developed.   HENT:      Head: Normocephalic and atraumatic.      Right Ear: Tympanic membrane normal.      Left Ear: Tympanic membrane normal.      Nose: Nose normal.      Mouth/Throat:      Mouth: Mucous membranes are moist.      Pharynx: Oropharynx is clear.   Eyes:      Conjunctiva/sclera: Conjunctivae normal.   Cardiovascular:      Rate and Rhythm: Normal rate and regular rhythm.      Heart sounds: No murmur heard.  Pulmonary:      Effort: Pulmonary effort is normal. No respiratory distress.      Breath sounds: Normal breath sounds.   Abdominal:      Palpations: Abdomen is soft.      Tenderness: There is no abdominal tenderness.   Musculoskeletal:         General: No swelling.      Cervical back: Neck supple.   Skin:     General: Skin is warm and dry.      Capillary Refill: Capillary refill takes less than 2 seconds.   Neurological:      Mental Status: She is alert and oriented to person, place, and time.   Psychiatric:         Mood and Affect: Mood normal.       Administrative Statements   I have spent a total time of 45 minutes in caring for this patient on the day of the visit/encounter including Patient and family education, Impressions, Counseling / Coordination of care, Documenting in the medical record, Reviewing / ordering tests, medicine, procedures  , and Obtaining or reviewing history  .  "

## 2024-08-20 NOTE — PROGRESS NOTES
Patient returned to office in the afternoon after being seen by Dr. Alvarez earlier today stating that she forgot to mention that she requires referral to dentist as she was previously told that she needs root canal performed due to chronic pain. Referral was subsequently placed to dentistry.

## 2024-08-20 NOTE — PATIENT INSTRUCTIONS
"Patient Education     Routine physical for adults   The Basics   Written by the doctors and editors at [facility]   What is a physical? -- A physical is a routine visit, or \"check-up,\" with your doctor. You might also hear it called a \"wellness visit\" or \"preventive visit.\"  During each visit, the doctor will:   Ask about your physical and mental health   Ask about your habits, behaviors, and lifestyle   Do an exam   Give you vaccines if needed   Talk to you about any medicines you take   Give advice about your health   Answer your questions  Getting regular check-ups is an important part of taking care of your health. It can help your doctor find and treat any problems you have. But it's also important for preventing health problems.  A routine physical is different from a \"sick visit.\" A sick visit is when you see a doctor because of a health concern or problem. Since physicals are scheduled ahead of time, you can think about what you want to ask the doctor.  How often should I get a physical? -- It depends on your age and health. In general, for people age 21 years and older:   If you are younger than 50 years, you might be able to get a physical every 3 years.   If you are 50 years or older, your doctor might recommend a physical every year.  If you have an ongoing health condition, like diabetes or high blood pressure, your doctor will probably want to see you more often.  What happens during a physical? -- In general, each visit will include:   Physical exam - The doctor or nurse will check your height, weight, heart rate, and blood pressure. They will also look at your eyes and ears. They will ask about how you are feeling and whether you have any symptoms that bother you.   Medicines - It's a good idea to bring a list of all the medicines you take to each doctor visit. Your doctor will talk to you about your medicines and answer any questions. Tell them if you are having any side effects that bother you. You " "should also tell them if you are having trouble paying for any of your medicines.   Habits and behaviors - This includes:   Your diet   Your exercise habits   Whether you smoke, drink alcohol, or use drugs   Whether you are sexually active   Whether you feel safe at home  Your doctor will talk to you about things you can do to improve your health and lower your risk of health problems. They will also offer help and support. For example, if you want to quit smoking, they can give you advice and might prescribe medicines. If you want to improve your diet or get more physical activity, they can help you with this, too.   Lab tests, if needed - The tests you get will depend on your age and situation. For example, your doctor might want to check your:   Cholesterol   Blood sugar   Iron level   Vaccines - The recommended vaccines will depend on your age, health, and what vaccines you already had. Vaccines are very important because they can prevent certain serious or deadly infections.   Discussion of screening - \"Screening\" means checking for diseases or other health problems before they cause symptoms. Your doctor can recommend screening based on your age, risk, and preferences. This might include tests to check for:   Cancer, such as breast, prostate, cervical, ovarian, colorectal, prostate, lung, or skin cancer   Sexually transmitted infections, such as chlamydia and gonorrhea   Mental health conditions like depression and anxiety  Your doctor will talk to you about the different types of screening tests. They can help you decide which screenings to have. They can also explain what the results might mean.   Answering questions - The physical is a good time to ask the doctor or nurse questions about your health. If needed, they can refer you to other doctors or specialists, too.  Adults older than 65 years often need other care, too. As you get older, your doctor will talk to you about:   How to prevent falling at " home   Hearing or vision tests   Memory testing   How to take your medicines safely   Making sure that you have the help and support you need at home  All topics are updated as new evidence becomes available and our peer review process is complete.  This topic retrieved from [employer] on: May 02, 2024.  Topic 239581 Version 1.0  Release: 32.4.3 - C32.122  © 2024 UpToDate, Inc. and/or its affiliates. All rights reserved.  Consumer Information Use and Disclaimer   Disclaimer: This generalized information is a limited summary of diagnosis, treatment, and/or medication information. It is not meant to be comprehensive and should be used as a tool to help the user understand and/or assess potential diagnostic and treatment options. It does NOT include all information about conditions, treatments, medications, side effects, or risks that may apply to a specific patient. It is not intended to be medical advice or a substitute for the medical advice, diagnosis, or treatment of a health care provider based on the health care provider's examination and assessment of a patient's specific and unique circumstances. Patients must speak with a health care provider for complete information about their health, medical questions, and treatment options, including any risks or benefits regarding use of medications. This information does not endorse any treatments or medications as safe, effective, or approved for treating a specific patient. UpToDate, Inc. and its affiliates disclaim any warranty or liability relating to this information or the use thereof.The use of this information is governed by the Terms of Use, available at https://www.woltersServiceMeshuwer.com/en/know/clinical-effectiveness-terms. 2024© UpToDate, Inc. and its affiliates and/or licensors. All rights reserved.  Copyright   © 2024 UpToDate, Inc. and/or its affiliates. All rights reserved.    Patient Education     Routine physical for adults   The Basics   Written by the  "doctors and editors [facility]   What is a physical? -- A physical is a routine visit, or \"check-up,\" with your doctor. You might also hear it called a \"wellness visit\" or \"preventive visit.\"  During each visit, the doctor will:   Ask about your physical and mental health   Ask about your habits, behaviors, and lifestyle   Do an exam   Give you vaccines if needed   Talk to you about any medicines you take   Give advice about your health   Answer your questions  Getting regular check-ups is an important part of taking care of your health. It can help your doctor find and treat any problems you have. But it's also important for preventing health problems.  A routine physical is different from a \"sick visit.\" A sick visit is when you see a doctor because of a health concern or problem. Since physicals are scheduled ahead of time, you can think about what you want to ask the doctor.  How often should I get a physical? -- It depends on your age and health. In general, for people age 21 years and older:   If you are younger than 50 years, you might be able to get a physical every 3 years.   If you are 50 years or older, your doctor might recommend a physical every year.  If you have an ongoing health condition, like diabetes or high blood pressure, your doctor will probably want to see you more often.  What happens during a physical? -- In general, each visit will include:   Physical exam - The doctor or nurse will check your height, weight, heart rate, and blood pressure. They will also look at your eyes and ears. They will ask about how you are feeling and whether you have any symptoms that bother you.   Medicines - It's a good idea to bring a list of all the medicines you take to each doctor visit. Your doctor will talk to you about your medicines and answer any questions. Tell them if you are having any side effects that bother you. You should also tell them if you are having trouble paying for any of your " "medicines.   Habits and behaviors - This includes:   Your diet   Your exercise habits   Whether you smoke, drink alcohol, or use drugs   Whether you are sexually active   Whether you feel safe at home  Your doctor will talk to you about things you can do to improve your health and lower your risk of health problems. They will also offer help and support. For example, if you want to quit smoking, they can give you advice and might prescribe medicines. If you want to improve your diet or get more physical activity, they can help you with this, too.   Lab tests, if needed - The tests you get will depend on your age and situation. For example, your doctor might want to check your:   Cholesterol   Blood sugar   Iron level   Vaccines - The recommended vaccines will depend on your age, health, and what vaccines you already had. Vaccines are very important because they can prevent certain serious or deadly infections.   Discussion of screening - \"Screening\" means checking for diseases or other health problems before they cause symptoms. Your doctor can recommend screening based on your age, risk, and preferences. This might include tests to check for:   Cancer, such as breast, prostate, cervical, ovarian, colorectal, prostate, lung, or skin cancer   Sexually transmitted infections, such as chlamydia and gonorrhea   Mental health conditions like depression and anxiety  Your doctor will talk to you about the different types of screening tests. They can help you decide which screenings to have. They can also explain what the results might mean.   Answering questions - The physical is a good time to ask the doctor or nurse questions about your health. If needed, they can refer you to other doctors or specialists, too.  Adults older than 65 years often need other care, too. As you get older, your doctor will talk to you about:   How to prevent falling at home   Hearing or vision tests   Memory testing   How to take your " medicines safely   Making sure that you have the help and support you need at home  All topics are updated as new evidence becomes available and our peer review process is complete.  This topic retrieved from [employer] on: May 02, 2024.  Topic 347008 Version 1.0  Release: 32.4.3 - C32.122  © 2024 UpToDate, Inc. and/or its affiliates. All rights reserved.  Consumer Information Use and Disclaimer   Disclaimer: This generalized information is a limited summary of diagnosis, treatment, and/or medication information. It is not meant to be comprehensive and should be used as a tool to help the user understand and/or assess potential diagnostic and treatment options. It does NOT include all information about conditions, treatments, medications, side effects, or risks that may apply to a specific patient. It is not intended to be medical advice or a substitute for the medical advice, diagnosis, or treatment of a health care provider based on the health care provider's examination and assessment of a patient's specific and unique circumstances. Patients must speak with a health care provider for complete information about their health, medical questions, and treatment options, including any risks or benefits regarding use of medications. This information does not endorse any treatments or medications as safe, effective, or approved for treating a specific patient. UpToDate, Inc. and its affiliates disclaim any warranty or liability relating to this information or the use thereof.The use of this information is governed by the Terms of Use, available at https://www.woltersChuteuwer.com/en/know/clinical-effectiveness-terms. 2024© UpToDate, Inc. and its affiliates and/or licensors. All rights reserved.  Copyright   © 2024 UpToDate, Inc. and/or its affiliates. All rights reserved.    Patient Education     Routine physical for adults   The Basics   Written by the doctors and editors at [facility]   What is a physical? -- A physical is a  "routine visit, or \"check-up,\" with your doctor. You might also hear it called a \"wellness visit\" or \"preventive visit.\"  During each visit, the doctor will:   Ask about your physical and mental health   Ask about your habits, behaviors, and lifestyle   Do an exam   Give you vaccines if needed   Talk to you about any medicines you take   Give advice about your health   Answer your questions  Getting regular check-ups is an important part of taking care of your health. It can help your doctor find and treat any problems you have. But it's also important for preventing health problems.  A routine physical is different from a \"sick visit.\" A sick visit is when you see a doctor because of a health concern or problem. Since physicals are scheduled ahead of time, you can think about what you want to ask the doctor.  How often should I get a physical? -- It depends on your age and health. In general, for people age 21 years and older:   If you are younger than 50 years, you might be able to get a physical every 3 years.   If you are 50 years or older, your doctor might recommend a physical every year.  If you have an ongoing health condition, like diabetes or high blood pressure, your doctor will probably want to see you more often.  What happens during a physical? -- In general, each visit will include:   Physical exam - The doctor or nurse will check your height, weight, heart rate, and blood pressure. They will also look at your eyes and ears. They will ask about how you are feeling and whether you have any symptoms that bother you.   Medicines - It's a good idea to bring a list of all the medicines you take to each doctor visit. Your doctor will talk to you about your medicines and answer any questions. Tell them if you are having any side effects that bother you. You should also tell them if you are having trouble paying for any of your medicines.   Habits and behaviors - This includes:   Your diet   Your exercise " "habits   Whether you smoke, drink alcohol, or use drugs   Whether you are sexually active   Whether you feel safe at home  Your doctor will talk to you about things you can do to improve your health and lower your risk of health problems. They will also offer help and support. For example, if you want to quit smoking, they can give you advice and might prescribe medicines. If you want to improve your diet or get more physical activity, they can help you with this, too.   Lab tests, if needed - The tests you get will depend on your age and situation. For example, your doctor might want to check your:   Cholesterol   Blood sugar   Iron level   Vaccines - The recommended vaccines will depend on your age, health, and what vaccines you already had. Vaccines are very important because they can prevent certain serious or deadly infections.   Discussion of screening - \"Screening\" means checking for diseases or other health problems before they cause symptoms. Your doctor can recommend screening based on your age, risk, and preferences. This might include tests to check for:   Cancer, such as breast, prostate, cervical, ovarian, colorectal, prostate, lung, or skin cancer   Sexually transmitted infections, such as chlamydia and gonorrhea   Mental health conditions like depression and anxiety  Your doctor will talk to you about the different types of screening tests. They can help you decide which screenings to have. They can also explain what the results might mean.   Answering questions - The physical is a good time to ask the doctor or nurse questions about your health. If needed, they can refer you to other doctors or specialists, too.  Adults older than 65 years often need other care, too. As you get older, your doctor will talk to you about:   How to prevent falling at home   Hearing or vision tests   Memory testing   How to take your medicines safely   Making sure that you have the help and support you need at home  All " topics are updated as new evidence becomes available and our peer review process is complete.  This topic retrieved from [employer] on: May 02, 2024.  Topic 028046 Version 1.0  Release: 32.4.3 - C32.122  © 2024 UpToDate, Inc. and/or its affiliates. All rights reserved.  Consumer Information Use and Disclaimer   Disclaimer: This generalized information is a limited summary of diagnosis, treatment, and/or medication information. It is not meant to be comprehensive and should be used as a tool to help the user understand and/or assess potential diagnostic and treatment options. It does NOT include all information about conditions, treatments, medications, side effects, or risks that may apply to a specific patient. It is not intended to be medical advice or a substitute for the medical advice, diagnosis, or treatment of a health care provider based on the health care provider's examination and assessment of a patient's specific and unique circumstances. Patients must speak with a health care provider for complete information about their health, medical questions, and treatment options, including any risks or benefits regarding use of medications. This information does not endorse any treatments or medications as safe, effective, or approved for treating a specific patient. UpToDate, Inc. and its affiliates disclaim any warranty or liability relating to this information or the use thereof.The use of this information is governed by the Terms of Use, available at https://www.woltersTrendy Entertainmentuwer.com/en/know/clinical-effectiveness-terms. 2024© UpToDate, Inc. and its affiliates and/or licensors. All rights reserved.  Copyright   © 2024 UpToDate, Inc. and/or its affiliates. All rights reserved.

## 2024-08-20 NOTE — TELEPHONE ENCOUNTER
Called Pt in regards to routine referral. According to chart, Pt has a NonPar Insurance ([facility]). Will offer Pt a packet with outside resources, or advise to contact insurance company for a list of participating providers under insurance plan.  IC left vm for Pt to call back the intake dept at [phone], opt 3.  Closing referral at this time.

## 2024-08-20 NOTE — PROGRESS NOTES
Adult Annual Physical  Name: Kayleigh Harry      : 1960      MRN: [id number]  Encounter Provider: Shy Alvarez MD  Encounter Date: 2024   Encounter department: [facility]    Assessment & Plan   1. Annual physical exam  2. BMI 35.0-35.9,adult  Comments:  Counseled on healthy diet and exercise  Orders:  -     Hemoglobin A1C; Future; Expected date: 2024  -     Comprehensive metabolic panel; Future; Expected date: 2024  -     TSH, 3rd generation; Future; Expected date: 2024  -     T4; Future  -     Ambulatory Referral to Sleep Medicine; Future  3. Insomnia, unspecified type  -     Ambulatory Referral to Sleep Medicine; Future  4. Anxiety  Comments:  Patient reports increased anxiety lately due to the stress from preparing for citizenship exam  She states that retaining all that information is very hard  Orders:  -     Ambulatory referral to Psych Services; Future  5. Palpitations  Comments:  Patient reports on and off palpitations lately  She states that preparing for citizenship exam has been very hard and has been causing a lot of stress  Orders:  -     TSH, 3rd generation; Future; Expected date: 2024  -     Lipid panel; Future; Expected date: 2024  6. 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 Screening for cardiovascular condition  -     Hemoglobin A1C; Future; Expected date: 2024  -     Comprehensive metabolic panel; Future; Expected date: 2024  -     CBC and differential; Future; Expected date: 2024  -     TSH, 3rd generation; Future; Expected date: 2024  -     Lipid panel; Future; Expected date: 2024  -     T4; Future  8. Encounter for screening mammogram for breast cancer  -     Mammo screening bilateral w 3d & cad; Future  9.  "Snoring  -     Ambulatory Referral to Sleep Medicine; Future    Immunizations and preventive care screenings were discussed with patient today. Appropriate education was printed on patient's after visit summary.    Counseling:  {Annual Physical; Counselin}         History of Present Illness   {Disappearing Hyperlinks I Encounters * My Last Note * Since Last Visit * History :94600}  Adult Annual Physical  Review of Systems   Constitutional:  Negative for chills and fever.   HENT:  Negative for ear pain and sore throat.    Eyes:  Negative for pain and visual disturbance.   Respiratory:  Negative for cough and shortness of breath.    Cardiovascular:  Negative for chest pain and palpitations.   Gastrointestinal:  Negative for abdominal pain and vomiting.   Genitourinary:  Negative for dysuria and hematuria.   Musculoskeletal:  Negative for arthralgias and back pain.   Skin:  Negative for color change and rash.   Neurological:  Negative for seizures and syncope.   All other systems reviewed and are negative.    {Select to Display [facility] (Optional):59030}    Objective   {Disappearing Hyperlinks   Review Vitals * Enter New Vitals * Results Review * Labs * Imaging * Cardiology * Procedures * Lung Cancer Screening :63937}  /86 (BP Location: Right arm)   Pulse 82   Temp (!) 96.9 °F (36.1 °C) (Tympanic)   Ht 5' 4\" (1.626 m)   Wt 94 kg (207 lb 3.2 oz)   SpO2 97%   BMI 35.57 kg/m²     Physical Exam  {Administrative / Billing Section (Optional):45747}  "

## 2024-08-21 ENCOUNTER — TELEPHONE (OUTPATIENT)
Age: 64
End: 2024-08-21

## 2024-08-21 NOTE — TELEPHONE ENCOUNTER
"**Please see encounter from 8/20/24 \"Follow up (Routine Referral)     Dustin, Son, Verbal consent from patient to speak with them.    Writer relayed the message regarding insurance to the son. Son stated yes to please have the outside resource packet sent to the confirmed address on the chart.  "

## 2024-08-28 ENCOUNTER — TELEPHONE (OUTPATIENT)
Dept: FAMILY MEDICINE CLINIC | Facility: CLINIC | Age: 64
End: 2024-08-28

## 2024-08-28 ENCOUNTER — TELEPHONE (OUTPATIENT)
Age: 64
End: 2024-08-28

## 2024-08-28 ENCOUNTER — OFFICE VISIT (OUTPATIENT)
Dept: FAMILY MEDICINE CLINIC | Facility: CLINIC | Age: 64
End: 2024-08-28
Payer: COMMERCIAL

## 2024-08-28 VITALS
TEMPERATURE: 97.4 F | DIASTOLIC BLOOD PRESSURE: 84 MMHG | SYSTOLIC BLOOD PRESSURE: 134 MMHG | WEIGHT: 206.57 LBS | BODY MASS INDEX: 36.6 KG/M2 | OXYGEN SATURATION: 96 % | HEIGHT: 63 IN | HEART RATE: 76 BPM

## 2024-08-28 DIAGNOSIS — I10 PRIMARY HYPERTENSION: Primary | ICD-10-CM

## 2024-08-28 PROCEDURE — 99213 OFFICE O/P EST LOW 20 MIN: CPT | Performed by: FAMILY MEDICINE

## 2024-08-28 RX ORDER — LISINOPRIL 5 MG/1
5 TABLET ORAL DAILY
Qty: 30 TABLET | Refills: 2 | Status: SHIPPED | OUTPATIENT
Start: 2024-08-28

## 2024-08-28 RX ORDER — AMLODIPINE BESYLATE 5 MG/1
5 TABLET ORAL DAILY
Qty: 30 TABLET | Refills: 2 | Status: CANCELLED | OUTPATIENT
Start: 2024-08-28

## 2024-08-28 NOTE — TELEPHONE ENCOUNTER
[id number]:  Called patient to relay test results below. Patient did not answer, unable to leave a message.   Called second number was her sons's but took message for patient to call our office back.

## 2024-08-28 NOTE — PROGRESS NOTES
Assessment & Plan     Diagnoses and all orders for this visit:    Primary hypertension  Comments:  -Patient was noted to have elevated blood pressure last visit   -Patient was advised to keep BP logs   -Logs reviewed for 1 week- 143/82, 147/88, 156/91, 148/82, 163/90, 151/94, Max 195/122  -Blood pressure today 134/84  -Given that patient has consistently had blood pressure over 140/90 on several occasions, we will initiate her on blood pressure medication  -We will initiate patient on lisinopril 5 mg daily  -Follow-up in 4 weeks  Orders:  -     lisinopril (ZESTRIL) 5 mg tablet; Take 1 tablet (5 mg total) by mouth daily        Subjective     Patient Id: Kayleigh Harry is a 64 y.o. female    Patient is a 64-year-old female who presents to the office today for HTN follow-up.  Patient was advised to keep BP logs last visit.  She brings logs noted for last 1 week.  Patient states that her blood pressure has been elevated and is concerned.  Denies fevers, chills, chest pain, SOB, abdominal pain, nausea/vomiting.  Patient reports increased stress given that she is in the process of taking citizenship exam.  Patient states that she is in the process of scheduling appointment with sleep medicine and psychotherapy.        Review of Systems   Constitutional:  Negative for chills and fever.   HENT:  Negative for ear pain and sore throat.    Eyes:  Negative for pain and visual disturbance.   Respiratory:  Negative for cough and shortness of breath.    Cardiovascular:  Negative for chest pain and palpitations.   Gastrointestinal:  Negative for abdominal pain and vomiting.   Genitourinary:  Negative for dysuria and hematuria.   Musculoskeletal:  Negative for arthralgias and back pain.   Skin:  Negative for color change and rash.   Neurological:  Negative for seizures and syncope.   All other systems reviewed and are negative.      Past Medical History:   Diagnosis Date    Depression     Hypertension        Past Surgical History:  "  Procedure Laterality Date    BREAST SURGERY      8 years ago, biopsy which was benign       Family History   Problem Relation Age of Onset    Depression Mother     Depression Father     Depression Brother     Diabetes Brother        Social History     Socioeconomic History    Marital status: /Civil Union     Spouse name: None    Number of children: None    Years of education: None    Highest education level: None   Occupational History    None   Tobacco Use    Smoking status: Never     Passive exposure: Never    Smokeless tobacco: Never   Vaping Use    Vaping status: Never Used   Substance and Sexual Activity    Alcohol use: Not Currently    Drug use: Never    Sexual activity: Yes     Partners: Male   Other Topics Concern    None   Social History Narrative    None     Social Determinants of Health     Financial Resource Strain: Not on file   Food Insecurity: Not on file   Transportation Needs: Not on file   Physical Activity: Not on file   Stress: Not on file   Social Connections: Unknown (6/18/2024)    Received from [employer]     How often do you feel lonely or isolated from those around you? (Adult - for ages 18 years and over): Not on file   Intimate Partner Violence: Not on file   Housing Stability: Not on file       No Known Allergies      Current Outpatient Medications:     chlorhexidine (PERIDEX) 0.12 % solution, RINSE ORALLY WITH 15 ML ONCE DAILY, Disp: , Rfl:     lisinopril (ZESTRIL) 5 mg tablet, Take 1 tablet (5 mg total) by mouth daily, Disp: 30 tablet, Rfl: 2    urea (CARMOL) 40 %, Apply 1 Application topically 2 (two) times a day, Disp: , Rfl:     meloxicam (Mobic) 15 mg tablet, Take 1 tablet (15 mg total) by mouth daily, Disp: 30 tablet, Rfl: 1    Objective     Vitals:    08/28/24 1553   BP: 134/84   Pulse: 76   Temp: (!) 97.4 °F (36.3 °C)   SpO2: 96%   Weight: 93.7 kg (206 lb 9.1 oz)   Height: 5' 3\" (1.6 m)       Physical Exam  Vitals and nursing note reviewed. "   Constitutional:       General: She is not in acute distress.     Appearance: She is well-developed.   HENT:      Head: Normocephalic and atraumatic.   Eyes:      Conjunctiva/sclera: Conjunctivae normal.   Cardiovascular:      Rate and Rhythm: Normal rate and regular rhythm.      Heart sounds: No murmur heard.  Pulmonary:      Effort: Pulmonary effort is normal. No respiratory distress.      Breath sounds: Normal breath sounds.   Abdominal:      Palpations: Abdomen is soft.      Tenderness: There is no abdominal tenderness.   Musculoskeletal:         General: No swelling.      Cervical back: Neck supple.   Skin:     General: Skin is warm and dry.      Capillary Refill: Capillary refill takes less than 2 seconds.   Neurological:      Mental Status: She is alert.   Psychiatric:         Mood and Affect: Mood normal.         Shy Alvarez MD  Family Medicine

## 2024-08-28 NOTE — TELEPHONE ENCOUNTER
Spoke with son who states pt is with him but does not speak english very well. Son aware mammogram was normal.     [facility] communication not updated

## 2024-08-28 NOTE — TELEPHONE ENCOUNTER
----- Message from Christianne Cruz MD sent at 8/27/2024 11:26 AM EDT -----  Regarding: Mammogram Results  Hi Ladies,    Can we please call patient and inform her of the below:      Please note that your mammogram did not show any masses changes concerning for breast cancer at this time. You may continue routine screening in 1 year.    Best,

## 2024-09-04 ENCOUNTER — VBI (OUTPATIENT)
Dept: ADMINISTRATIVE | Facility: OTHER | Age: 64
End: 2024-09-04

## 2024-09-04 NOTE — TELEPHONE ENCOUNTER
09/04/24 7:36 AM     Chart reviewed for CRC: Colonoscopy ; nothing is submitted to the patient's insurance at this time.     Karen Marina   [facility]

## 2024-09-05 ENCOUNTER — TELEPHONE (OUTPATIENT)
Age: 64
End: 2024-09-05

## 2024-09-05 NOTE — TELEPHONE ENCOUNTER
Park from [facility] called stating the patient is requesting an appt. For a sleep study. She would like ambulatory referral faxed over to them at [phone]

## 2024-09-09 ENCOUNTER — CONSULT (OUTPATIENT)
Dept: NEUROLOGY | Facility: CLINIC | Age: 64
End: 2024-09-09
Payer: COMMERCIAL

## 2024-09-09 VITALS
TEMPERATURE: 98.3 F | HEART RATE: 82 BPM | OXYGEN SATURATION: 98 % | HEIGHT: 63 IN | BODY MASS INDEX: 36.5 KG/M2 | SYSTOLIC BLOOD PRESSURE: 164 MMHG | WEIGHT: 206 LBS | DIASTOLIC BLOOD PRESSURE: 84 MMHG

## 2024-09-09 DIAGNOSIS — R41.9 COGNITIVE COMPLAINTS: ICD-10-CM

## 2024-09-09 DIAGNOSIS — R41.3 MEMORY DIFFICULTIES: Primary | ICD-10-CM

## 2024-09-09 DIAGNOSIS — F41.9 ANXIETY DISORDER: ICD-10-CM

## 2024-09-09 DIAGNOSIS — F43.10 PTSD (POST-TRAUMATIC STRESS DISORDER): ICD-10-CM

## 2024-09-09 PROCEDURE — 99245 OFF/OP CONSLTJ NEW/EST HI 55: CPT | Performed by: STUDENT IN AN ORGANIZED HEALTH CARE EDUCATION/TRAINING PROGRAM

## 2024-09-09 PROCEDURE — 99417 PROLNG OP E/M EACH 15 MIN: CPT | Performed by: STUDENT IN AN ORGANIZED HEALTH CARE EDUCATION/TRAINING PROGRAM

## 2024-09-09 RX ORDER — DIPHENOXYLATE HYDROCHLORIDE AND ATROPINE SULFATE 2.5; .025 MG/1; MG/1
1 TABLET ORAL DAILY
COMMUNITY

## 2024-09-09 NOTE — PROGRESS NOTES
ROS:    Review of Systems   Constitutional:  Negative for appetite change, fatigue and fever.   HENT: Negative.  Negative for hearing loss, tinnitus, trouble swallowing and voice change.    Eyes: Negative.  Negative for photophobia, pain and visual disturbance.   Respiratory: Negative.  Negative for shortness of breath.    Cardiovascular: Negative.  Negative for palpitations.   Gastrointestinal: Negative.  Negative for nausea and vomiting.   Endocrine: Negative.  Negative for cold intolerance.   Genitourinary: Negative.  Negative for dysuria, frequency and urgency.   Musculoskeletal:  Negative for back pain, gait problem, myalgias, neck pain and neck stiffness.   Skin: Negative.  Negative for rash.   Allergic/Immunologic: Negative.    Neurological:  Positive for headaches (2-3 times a week). Negative for dizziness, tremors, seizures, syncope, facial asymmetry, speech difficulty, weakness, light-headedness and numbness.   Hematological: Negative.  Does not bruise/bleed easily.   Psychiatric/Behavioral:  Positive for sleep disturbance. Negative for confusion and hallucinations.         Has trouble forming words  Has trouble understanding  Has trouble retaining information   All other systems reviewed and are negative.

## 2024-09-09 NOTE — PROGRESS NOTES
[facility]  Neurological Services Consult Note    Patient Name: Kayleigh Harry  : 1960    MRN: [id number]    CONSULTING PROVIDER:  Christianne Cruz MD  [address]  [place] [address]        Assessment/Plan:  1. Memory difficulties  MRI brain with and without contrast    Ambulatory referral to Neuropsychology      2. Cognitive complaints  Ambulatory Referral to Neurology    MRI brain with and without contrast    Ambulatory referral to Neuropsychology      3. Anxiety disorder  Ambulatory referral to Neuropsychology      4. PTSD (post-traumatic stress disorder)  Ambulatory referral to Neuropsychology        Kayleigh is a very pleasant 64-year-old woman with PMH PTSD, depression, HTN presenting for memory concerns.  She is accompanied by her  today, who helps provide history.  Interestingly, the majority of her cognitive and reported memory issues they are quite adamant have been present for at least the past 30 years, and have n increased slightly but not substantially over the past few months to years.  Indeed, a fairly constant theme throughout their history is that anxiety and stress seem to affect her fairly profoundly, and they have really noticed difficulties with the increased stress of what is now going to be her third attempt at the [place] citizenship examination.  Her headaches to do not sound like any classic headache presentation, and may also be more stress/anxiety related.  It is also worth noting that she has some functional components to her exam, including vibratory splitting of the forehead and mandible and a somewhat inconsistent sensory exam in her extremities.    Reviewed the above thought process and diagnoses at length today.  I highly recommend that she try to establish with psychology, either through [facility] or an alternate (encouraged that they discuss local psychology offices with their PCP, as these locations may have openings sooner than  "[facility])  Will obtain an MRI brain with and without contrast to ensure there is no underlying process that is contributing, particularly given the fact that there has been some degree of worsening since she started studying for the examination/in recent months.       I have also placed an order for formal neuropsychometric testing  I did encourage her to reach out and try to schedule an appointment with sleep medicine, she reported that he is having some difficulties in this area and a formal sleep medicine evaluation will help get to the bottom of it.  She will Return in about 6 months (around 3/9/2025).                                                                      Thank you for allowing me to participate in the care of your patient.  If there are any questions regarding evaluation please feel free to reach out.       ________________________________________________________________________________________________    Subjective:    64 y.o. female with PMH PTSD, depression, HTN presenting for memory concerns.    MoCA reportedly 14/30 per PCP (administered in Sinhala) in October 2023.    When she was going through the citizenship exam, she tells me that she was getting a headache with each question, and had trouble remembering the question in the moment.     Endorses having had memory issues for a while, \"having trouble retaining information.\"     Has difficulty sleeping at night due to dreams of her grandmother who raised her, and she cries a lot.     says that ever since he met her (>30 years ago), she can't remember things. Forgets children and grandchildren's dates of birth, phone numbers; \"she can't retain anything.\" He claims (as does she) that this hasn't really gotten much worse over time, however the nerves around obtaining the repeat citizenship test seems to have significantly affected her. She did apply twice in the past, and it was all in English, and both times she failed to pass. " "This time it is going to be in Setswana, however they fear that this will be even more difficult for her because she \"can't retain the answers.\" She has always had difficulties with arithmetic.     They are wondering about some sort of letter stating that she cannot retain information.    She does endorse being the kind of person whom anxiety affects quite strongly, and this repeat testing is causing substantial stress.    Also endorses putting things away but forgetting where she put them, and when she tries looking for them she gets a headache.     Cooking: She and her  both cook,  moreso. She does endorse some instances where she forgets about things on the stove or forgets to turn the iron off when ironing clothes.    -Has happened several times, not necessarily increasing infrequency  Driving: Does drive locally, not long distances, due to longer distances making her nervous. Doesn't like driving. Denies any instances of getting irrevocably lost going somewhere where she should know how to go.  Bills:  pays bills. She does say that if her  wasn't there, she would forget to pay things.     Does say since she started this citizenship process, she has been feeling her heart beating fast at times. Does endorse that she is overall very jumpy, will start very significantly for example when her  knocks on the door. This has always been the case, not a new phenomenon.       Headaches are described as follows:  Has had headaches for: many years; worsened with studying for the Citizenship exam.  Location: Sides of head, either temple, both at once..  Character: Like a hammer, very quick.   Severity: 5/10; has been a 9/10 once  Frequency: 2-3/week, when she tries to study something or thinks about the citizenship exam.   Length: Fast, \"go away as fast as they come.\" \"A few seconds.\"  Associated and exacerbating symptoms: dizziness, to the point that she has to lie down after the quick " "head pain. Does endorse general light sensitivity. Also states that her eyes will turn red and water.  -Thinking about a lot of stuff ; \"how I didn't live with my parents, they should've aborted me,\" and similar thoughts  -Saw a psychologist years ago, they recommended she see someone else, whom she did not see. She has been waiting, and \"they say they'll call me, then they cancel,\" and so on.             Current Outpatient Medications   Medication Sig Dispense Refill    chlorhexidine (PERIDEX) 0.12 % solution RINSE ORALLY WITH 15 ML ONCE DAILY      lisinopril (ZESTRIL) 5 mg tablet Take 1 tablet (5 mg total) by mouth daily 30 tablet 2    multivitamin (THERAGRAN) TABS Take 1 tablet by mouth daily      meloxicam (Mobic) 15 mg tablet Take 1 tablet (15 mg total) by mouth daily (Patient not taking: Reported on 9/9/2024) 30 tablet 1    urea (CARMOL) 40 % Apply 1 Application topically 2 (two) times a day (Patient not taking: Reported on 9/9/2024)       No current facility-administered medications for this visit.       No Known Allergies    Past Medical History:   Diagnosis Date    Depression     Hypertension     :   Past Surgical History:   Procedure Laterality Date    BREAST SURGERY      8 years ago, biopsy which was benign     Social History     Socioeconomic History    Marital status: /Civil Union     Spouse name: Not on file    Number of children: Not on file    Years of education: Not on file    Highest education level: Not on file   Occupational History    Not on file   Tobacco Use    Smoking status: Never     Passive exposure: Never    Smokeless tobacco: Never   Vaping Use    Vaping status: Never Used   Substance and Sexual Activity    Alcohol use: Yes     Comment: wine occasinally    Drug use: Never    Sexual activity: Yes     Partners: Male   Other Topics Concern    Not on file   Social History Narrative    Not on file     Social Determinants of Health     Financial Resource Strain: Not on file   Food " "Insecurity: Not on file   Transportation Needs: Not on file   Physical Activity: Not on file   Stress: Not on file   Social Connections: Unknown (6/18/2024)    Received from [employer]     How often do you feel lonely or isolated from those around you? (Adult - for ages 18 years and over): Not on file   Intimate Partner Violence: Not on file   Housing Stability: Not on file      Family History   Problem Relation Age of Onset    Depression Mother     Depression Father     Depression Brother     Diabetes Brother        Review of Symptoms:      10-point system review completed, all of which are negative except as mentioned above.    Labs:  8/22/2024:  CMP unremarkable  TSH/T4 unremarkable  CBC unremarkable    Imaging/Procedures:   *No pertinent neuroimaging*      Objective:  Physical Exam:      /84 (BP Location: Left arm, Patient Position: Sitting, Cuff Size: Adult)   Pulse 82   Temp 98.3 °F (36.8 °C) (Temporal)   Ht 5' 3\" (1.6 m)   Wt 93.4 kg (206 lb)   SpO2 98%   BMI 36.49 kg/m²      Gen: A&O x 4, NAD, cooperative  HEENT: NC/AT, EOMI, PERRL, mmm, no carotid bruits, neck supple, no meningeal signs  Chest: No evidence of respiratory distress, no accessory muscle use; no evidence of peripheral cyanosis  Abd: soft/NT/ND, +BS  Ext: no edema, no calf tenderness b/l  Endo: no thyromegaly  Psych: no depression or anxiety apparent   Skin: no rashes or lesions    Neurologic Exam:  Mental Status: A&O x 4, NAD, speech clear, language fluent, comprehension intact, repetition and naming intact    Cranial Nerve Exam:   CN II-XII intact: No papilledema on fundoscopic examination, PERRL, VFF, no nystagmus, no gaze paresis, sensation V1-V3 intact to light touch bilaterally, temperature equal bilaterally but reduced pinprick sensation left lower face (V2/V3), reduced vibratory sensation with vibratory splitting of forehead and mandible.  On left side of face muscles of facial expression symmetric; " hearing intact to conversational tone, palate elevates symmetrically, shoulder elevation symmetric and tongue protrudes midline with movement side to side.    Motor Exam:       Strength 5/5 UE's/LE's b/l  Tone and bulk normal   No pronator drift    Deep Tendon Reflexes: 2/4 biceps, triceps, brachioradialis, patellar, and achilles b/l, flexor plantar responses b/l    Sensation: Reduced vibratory sensation and light touch left upper and lower extremity, reduced pinprick right upper and right lower extremity, reduced temperature sensation left upper extremity, equal temperature sensation bilateral lower extremities.    Coordination/Cerebellum:       Tremors--none      Rapidly alternating movements: no dysdiadochokinesia b/l                Heel-to-Shin: no dysmetria b/l      Finger-to-Nose: no dysmetria b/l    Gait and stance:      Gait: Normal casual, tiptoe, heel walk, and tandem gait.  No evidence of ataxia present.          I have spent a total time of 90-95 minutes on 09/09/24 in caring for this patient including Diagnostic results, Prognosis, Risks and benefits of tx options, Instructions for management, Patient and family education, Importance of tx compliance, Risk factor reductions, Documenting in the medical record, Reviewing / ordering tests, medicine, procedures, conveying concepts via  service, and Obtaining or reviewing history  .      Electronically signed by:    Jerod Atkinson DO  Board-Certified Neurology and Sleep Medicine  [facility]  09/09/24

## 2024-09-09 NOTE — PATIENT INSTRUCTIONS
-I have placed an order for formal neuropsychometric testing to get a better idea of what may be contributing to your memory concerns.   -These certainly may be primarily psychiatric and/or anxiety-induced.   -The headaches sound to be similarly related to anxiety; they do not sound consistent with any specific primary headache pattern.   -Continue to stay active physically, mentally, socially  -See Psychology as soon as possible. Could speak with your PCP about alternative practices in the area, due to [facility]'s waitlist.   -Keep your appointment with Sleep Medicine as well.    - Central Sched[facility]6 [phone]

## 2024-09-17 ENCOUNTER — HOSPITAL ENCOUNTER (OUTPATIENT)
Dept: MRI IMAGING | Facility: HOSPITAL | Age: 64
Discharge: HOME | End: 2024-09-17
Attending: STUDENT IN AN ORGANIZED HEALTH CARE EDUCATION/TRAINING PROGRAM
Payer: COMMERCIAL

## 2024-09-17 DIAGNOSIS — R41.3 MEMORY DIFFICULTIES: ICD-10-CM

## 2024-09-17 DIAGNOSIS — R41.9 COGNITIVE COMPLAINTS: ICD-10-CM

## 2024-09-17 PROCEDURE — 70553 MRI BRAIN STEM W/O & W/DYE: CPT

## 2024-09-17 PROCEDURE — A9585 GADOBUTROL INJECTION: HCPCS | Performed by: STUDENT IN AN ORGANIZED HEALTH CARE EDUCATION/TRAINING PROGRAM

## 2024-09-17 RX ORDER — GADOBUTROL 604.72 MG/ML
9 INJECTION INTRAVENOUS
Status: COMPLETED | OUTPATIENT
Start: 2024-09-17 | End: 2024-09-17

## 2024-09-17 RX ADMIN — GADOBUTROL 9 ML: 604.72 INJECTION INTRAVENOUS at 15:40

## 2024-09-22 PROBLEM — F32.0 CURRENT MILD EPISODE OF MAJOR DEPRESSIVE DISORDER (HCC): Status: ACTIVE | Noted: 2024-09-22

## 2024-09-22 PROBLEM — I10 PRIMARY HYPERTENSION: Status: ACTIVE | Noted: 2024-09-22

## 2024-10-01 ENCOUNTER — OFFICE VISIT (OUTPATIENT)
Dept: FAMILY MEDICINE CLINIC | Facility: CLINIC | Age: 64
End: 2024-10-01
Payer: COMMERCIAL

## 2024-10-01 VITALS
HEART RATE: 81 BPM | WEIGHT: 208 LBS | DIASTOLIC BLOOD PRESSURE: 72 MMHG | OXYGEN SATURATION: 98 % | HEIGHT: 63 IN | TEMPERATURE: 98.1 F | SYSTOLIC BLOOD PRESSURE: 130 MMHG | BODY MASS INDEX: 36.86 KG/M2

## 2024-10-01 DIAGNOSIS — I10 PRIMARY HYPERTENSION: ICD-10-CM

## 2024-10-01 DIAGNOSIS — I10 PRIMARY HYPERTENSION: Primary | ICD-10-CM

## 2024-10-01 PROCEDURE — 99214 OFFICE O/P EST MOD 30 MIN: CPT | Performed by: FAMILY MEDICINE

## 2024-10-01 NOTE — PROGRESS NOTES
"Ambulatory Visit  Name: Kayleigh Harry      : 1960      MRN: [id number]  Encounter Provider: Shy Alvarez MD  Encounter Date: 10/1/2024   Encounter department: [facility] PRIMARY CARE      Assessment & Plan  Primary hypertension  - /72, improved from prior visit  - Initiated on Lisinopril 5 mg daily last visit  - Denies side effects  - We will continue current Rx  - F/u in 6 months  - Counseled on lifestyle modifications, low salt diet            History of Present Illness     Patient is a 65 yo F who presents to the office for BP follow up. Patient was initiated on Lisinopril last visit. She states that she is compliant with the medication. Denies S/e from the medication. No other acute concerns today.       Review of Systems   Constitutional:  Negative for chills and fever.   HENT:  Negative for ear pain and sore throat.    Eyes:  Negative for pain and visual disturbance.   Respiratory:  Negative for cough and shortness of breath.    Cardiovascular:  Negative for chest pain and palpitations.   Gastrointestinal:  Negative for abdominal pain and vomiting.   Genitourinary:  Negative for dysuria and hematuria.   Musculoskeletal:  Negative for arthralgias and back pain.   Skin:  Negative for color change and rash.   Neurological:  Negative for seizures and syncope.   All other systems reviewed and are negative.    Objective     /72 (BP Location: Left arm)   Pulse 81   Temp 98.1 °F (36.7 °C) (Tympanic)   Ht 5' 3\" (1.6 m)   Wt 94.3 kg (208 lb)   SpO2 98%   BMI 36.85 kg/m²     Physical Exam  Vitals and nursing note reviewed.   Constitutional:       General: She is not in acute distress.     Appearance: She is well-developed.   HENT:      Head: Normocephalic and atraumatic.   Eyes:      Conjunctiva/sclera: Conjunctivae normal.   Cardiovascular:      Rate and Rhythm: Normal rate and regular rhythm.      Heart sounds: No murmur heard.  Pulmonary:      Effort: Pulmonary effort is " normal. No respiratory distress.      Breath sounds: Normal breath sounds.   Abdominal:      Palpations: Abdomen is soft.      Tenderness: There is no abdominal tenderness.   Musculoskeletal:         General: No swelling.      Cervical back: Neck supple.   Skin:     General: Skin is warm and dry.      Capillary Refill: Capillary refill takes less than 2 seconds.   Neurological:      Mental Status: She is alert.   Psychiatric:         Mood and Affect: Mood normal.         Shy Alvarez MD

## 2024-10-01 NOTE — ASSESSMENT & PLAN NOTE
- /72, improved from prior visit  - Initiated on Lisinopril 5 mg daily last visit  - Denies side effects  - We will continue current Rx  - F/u in 6 months  - Counseled on lifestyle modifications, low salt diet

## 2024-10-04 RX ORDER — LISINOPRIL 5 MG/1
5 TABLET ORAL DAILY
Qty: 90 TABLET | Refills: 1 | Status: SHIPPED | OUTPATIENT
Start: 2024-10-04

## 2024-10-29 ENCOUNTER — OFFICE VISIT (OUTPATIENT)
Dept: FAMILY MEDICINE CLINIC | Facility: CLINIC | Age: 64
End: 2024-10-29

## 2024-10-29 VITALS
HEIGHT: 63 IN | OXYGEN SATURATION: 99 % | HEART RATE: 70 BPM | WEIGHT: 204.2 LBS | BODY MASS INDEX: 36.18 KG/M2 | SYSTOLIC BLOOD PRESSURE: 150 MMHG | DIASTOLIC BLOOD PRESSURE: 88 MMHG | TEMPERATURE: 97.2 F

## 2024-10-29 DIAGNOSIS — F32.A MILD DEPRESSION: ICD-10-CM

## 2024-10-29 DIAGNOSIS — I10 PRIMARY HYPERTENSION: ICD-10-CM

## 2024-10-29 DIAGNOSIS — R07.9 CHEST PAIN, UNSPECIFIED TYPE: Primary | ICD-10-CM

## 2024-10-29 RX ORDER — LISINOPRIL 5 MG/1
5 TABLET ORAL DAILY
Qty: 90 TABLET | Refills: 1 | Status: SHIPPED | OUTPATIENT
Start: 2024-10-29

## 2024-10-29 NOTE — PROGRESS NOTES
Ambulatory Visit  Name: Kayleigh Harry      : 1960      MRN: [id number]  Encounter Provider: Shy Alvarez MD  Encounter Date: 10/29/2024   Encounter department: [facility] PRIMARY CARE    Assessment & Plan  Chest pain, unspecified type  -Patient reports chest tightness lasting few seconds during heated conversation with her   -Denies other acute symptoms today  -Point-of-care EKG showing normal sinus rhythm, very mild ST changes noted in V6, aVF, does not look significant  -Reports that this is her 1st episode  -Less likely ACS, suspect more situational symptom  -Given her risk factors including HTN, dyslipidemia, we will obtain stress test to rule out ACS  -Patient travelling to  in a few weeks, will await stress test in the meanwhile  Orders:    Stress test only, exercise; Future    Primary hypertension  -Refills given  Orders:    lisinopril (ZESTRIL) 5 mg tablet; Take 1 tablet (5 mg total) by mouth daily    Mild depression  Depression Screening Follow-up Plan: Patient's depression screening was positive with a PHQ-9 score of 7. Patient assessed for underlying major depression. They have no active suicidal ideations. Brief counseling provided and recommend additional follow-up/re-evaluation next office visit.  -Mild depression on screening  -Patient travelling to  in a few weeks  -Recommend therapy in the  or once patient is back from             History of Present Illness     Patient is a 64-year-old female who presents to the office today with complaints of chest pain after a heated conversation with her .  She states that it felt more of a chest discomfort.  She denies sweating, palpitations, dizziness, loss of consciousness.  She reports that her symptoms lasted few seconds.  She denies current chest pain, dizziness, SOB.  She states that her conversation with her  was worrying her a lot causing her symptoms..  She denies similar episodes in the past.   "Patient's  reports that she worries about many things.  Patient otherwise denies any acute symptoms today.      Review of Systems   Constitutional:  Negative for chills and fever.   HENT:  Negative for ear pain and sore throat.    Eyes:  Negative for pain and visual disturbance.   Respiratory:  Negative for cough, chest tightness (No current chest tightness) and shortness of breath.    Cardiovascular:  Negative for chest pain and palpitations.   Gastrointestinal:  Negative for abdominal pain and vomiting.   Genitourinary:  Negative for dysuria and hematuria.   Musculoskeletal:  Negative for arthralgias and back pain.   Skin:  Negative for color change and rash.   Neurological:  Negative for seizures and syncope.   All other systems reviewed and are negative.    Objective     /88 (BP Location: Left arm)   Pulse 70   Temp (!) 97.2 °F (36.2 °C) (Tympanic)   Ht 5' 3\" (1.6 m)   Wt 92.6 kg (204 lb 3.2 oz)   SpO2 99%   BMI 36.17 kg/m²     Physical Exam  Vitals and nursing note reviewed.   Constitutional:       General: She is not in acute distress.     Appearance: She is well-developed.   HENT:      Head: Normocephalic and atraumatic.   Eyes:      Conjunctiva/sclera: Conjunctivae normal.   Cardiovascular:      Rate and Rhythm: Normal rate and regular rhythm.      Heart sounds: No murmur heard.  Pulmonary:      Effort: Pulmonary effort is normal. No respiratory distress.      Breath sounds: Normal breath sounds.   Abdominal:      Palpations: Abdomen is soft.      Tenderness: There is no abdominal tenderness.   Musculoskeletal:         General: No swelling.      Cervical back: Neck supple.      Right lower leg: No edema.      Left lower leg: No edema.   Skin:     General: Skin is warm and dry.      Capillary Refill: Capillary refill takes less than 2 seconds.   Neurological:      Mental Status: She is alert.   Psychiatric:         Mood and Affect: Mood normal.         Shy Alvarez MD     "

## 2024-10-29 NOTE — ASSESSMENT & PLAN NOTE
-Refills given  Orders:    lisinopril (ZESTRIL) 5 mg tablet; Take 1 tablet (5 mg total) by mouth daily

## 2025-05-05 ENCOUNTER — TELEPHONE (OUTPATIENT)
Dept: FAMILY MEDICINE CLINIC | Facility: CLINIC | Age: 65
End: 2025-05-05

## 2025-05-05 NOTE — TELEPHONE ENCOUNTER
Patient came in asking to see Dr. Ocampo. I informed her she was busy with patients but I can relay a message. She is requesting a letter stating her memory issues for immigration. She says she has trouble retaining information and that Dr. Ocampo is aware of this.

## 2025-05-06 NOTE — TELEPHONE ENCOUNTER
[id number]:  Called patient to inform her of the message from Dr. Ocampo regarding forms. Patient did not answer and could not leave a voicemail. Dialed out twice with same response.     Hi Ladies,    Please remind patient that she was evaluated by Neurology and was referred to Neuropsychology for further testing. Since they are the only specialist that can complete this testing, she would need to have this done first.    Thanks,

## 2025-05-09 NOTE — TELEPHONE ENCOUNTER
[id number]:  Called patient to inform her of the message from Dr. Ocampo regarding forms. Patient did not answer and could not leave a voicemail, called all numbers on patient's chart. Dialed out twice with same response.      Hi Ladies,    Please remind patient that she was evaluated by Neurology and was referred to Neuropsychology for further testing. Since they are the only specialist that can complete this testing, she would need to have this done first.    Thanks,

## 2025-05-12 NOTE — TELEPHONE ENCOUNTER
3rd Attempt:   [id number]:  Called patient to inform her of the message from Dr. Ocampo regarding forms. Patient did not answer and could not leave a voicemail. Dialed out twice with same response.      Hi Ladies,    Please remind patient that she was evaluated by Neurology and was referred to Neuropsychology for further testing. Since they are the only specialist that can complete this testing, she would need to have this done first.    Thanks,

## 2025-06-09 ENCOUNTER — VBI (OUTPATIENT)
Dept: ADMINISTRATIVE | Facility: OTHER | Age: 65
End: 2025-06-09

## 2025-06-09 NOTE — TELEPHONE ENCOUNTER
06/09/25 12:18 PM     Chart reviewed for CRC: Colonoscopy ; nothing is submitted to the patient's insurance at this time.     Karen Marina   [facility]

## 2025-06-18 ENCOUNTER — OFFICE VISIT (OUTPATIENT)
Dept: FAMILY MEDICINE CLINIC | Facility: CLINIC | Age: 65
End: 2025-06-18
Payer: COMMERCIAL

## 2025-06-18 VITALS
WEIGHT: 207 LBS | BODY MASS INDEX: 36.68 KG/M2 | SYSTOLIC BLOOD PRESSURE: 132 MMHG | DIASTOLIC BLOOD PRESSURE: 72 MMHG | OXYGEN SATURATION: 97 % | HEART RATE: 74 BPM | TEMPERATURE: 95 F | HEIGHT: 63 IN

## 2025-06-18 DIAGNOSIS — I10 PRIMARY HYPERTENSION: Primary | ICD-10-CM

## 2025-06-18 DIAGNOSIS — G47.00 INSOMNIA, UNSPECIFIED TYPE: ICD-10-CM

## 2025-06-18 DIAGNOSIS — R07.9 CHEST PAIN, UNSPECIFIED TYPE: ICD-10-CM

## 2025-06-18 DIAGNOSIS — Z13.6 SCREENING FOR CARDIOVASCULAR CONDITION: ICD-10-CM

## 2025-06-18 PROCEDURE — 99214 OFFICE O/P EST MOD 30 MIN: CPT | Performed by: FAMILY MEDICINE

## 2025-06-18 NOTE — ASSESSMENT & PLAN NOTE
-Patient states that ever since she passed her citizenship exam, she has been feeling much more relaxed and therefore stopped her lisinopril.  -Patient states that she has not been taking her lisinopril 5 mg daily since a month  -Patient would prefer to be off the medication  -/72, at goal  -Will discontinue lisinopril for now  -Continue to monitor  Orders:    Lipid panel; Future    TSH, 3rd generation; Future    Comprehensive metabolic panel; Future    CBC and differential; Future    T4, free; Future

## 2025-06-18 NOTE — PROGRESS NOTES
Name: Kayleigh Harry      : 1960      MRN: [id number]  Encounter Provider: Shy Alvarez MD  Encounter Date: 2025   Encounter department: [facility] PRIMARY CARE      Assessment & Plan  Primary hypertension  -Patient states that ever since she passed her citizenship exam, she has been feeling much more relaxed and therefore stopped her lisinopril.  -Patient states that she has not been taking her lisinopril 5 mg daily since a month  -Patient would prefer to be off the medication  -/72, at goal  -Will discontinue lisinopril for now  -Continue to monitor  Orders:    Lipid panel; Future    TSH, 3rd generation; Future    Comprehensive metabolic panel; Future    CBC and differential; Future    T4, free; Future    Insomnia, unspecified type  - Patient states that during the preparation of her citizenship exam, she was not sleeping well  -Patient states that she passed her exam in May 2025 and her sleep has improved although not completely  -She states that she has no issues with initiation of sleep but has problems with maintenance of sleep  -Discussed sleep hygiene in detail  -May trial melatonin  -Follow-up if no improvement       Chest pain, unspecified type  - Patient was seen with complaints of chest pain back in 2025 and [place]  -Patient was supposed to get her exercise stress test which she had not completed as she traveled [place]  - Patient states that her chest pain has resolved ever since she passed her citizenship exam.  Patient states that she was very stressed during the preparation of the exam and that likely contributed to her chest pain.  She states that she is more relaxed since the passing of her exam and has not had the symptoms since  - Continue to monitor         Screening for cardiovascular condition  - Return for annual physical in 8 weeks  Orders:    Lipid panel; Future    TSH, 3rd generation; Future    Comprehensive  "metabolic panel; Future    CBC and differential; Future    T4, free; Future         History of Present Illness     Patient is a 66 yo F who presents to the office with c/o insomnia since few months.  Patient was last seen in October, 2025 for chest pain and had travel to  after.  She states that she returned from  2 weeks ago.  She states that her chest pain has completely resolved since passing her citizenship exam.  She states that she passed exam about a month ago.  She states that since passing the exam, she has been sleeping better although not completely.  Patient states that she has no issues with initiation of sleep but has issues with maintenance of sleep.  Patient also reports that she has been feeling more relaxed since passing her citizenship exam and therefore has not been taking the lisinopril 5 mg daily.  She states that her blood pressure was very high in the office back in 2024 as she was very stressed back then.  She states that she prefers to be off the medication for now.  Patient otherwise denies any other acute concerns today.      Review of Systems   Constitutional:  Negative for chills and fever.   HENT:  Negative for ear pain and sore throat.    Eyes:  Negative for pain and visual disturbance.   Respiratory:  Negative for cough and shortness of breath.    Cardiovascular:  Negative for chest pain and palpitations.   Gastrointestinal:  Negative for abdominal pain and vomiting.   Genitourinary:  Negative for dysuria and hematuria.   Musculoskeletal:  Negative for arthralgias and back pain.   Skin:  Negative for color change and rash.   Neurological:  Negative for seizures and syncope.   Psychiatric/Behavioral:  Positive for sleep disturbance.    All other systems reviewed and are negative.    Objective     /72   Pulse 74   Temp (!) 95 °F (35 °C)   Ht 5' 3\" (1.6 m)   Wt 93.9 kg (207 lb)   SpO2 97%   BMI 36.67 kg/m²     Physical Exam  Vitals and nursing note reviewed. "   Constitutional:       General: She is not in acute distress.     Appearance: She is well-developed.   HENT:      Head: Normocephalic and atraumatic.     Eyes:      Conjunctiva/sclera: Conjunctivae normal.       Cardiovascular:      Rate and Rhythm: Normal rate and regular rhythm.      Heart sounds: No murmur heard.  Pulmonary:      Effort: Pulmonary effort is normal. No respiratory distress.      Breath sounds: Normal breath sounds.   Abdominal:      Palpations: Abdomen is soft.      Tenderness: There is no abdominal tenderness.     Musculoskeletal:         General: No swelling.      Cervical back: Neck supple.     Skin:     General: Skin is warm and dry.      Capillary Refill: Capillary refill takes less than 2 seconds.     Neurological:      Mental Status: She is alert.     Psychiatric:         Mood and Affect: Mood normal.       Shy Alvarez MD

## 2025-07-02 ENCOUNTER — APPOINTMENT (OUTPATIENT)
Dept: LAB | Facility: CLINIC | Age: 65
End: 2025-07-02
Payer: COMMERCIAL

## 2025-07-02 DIAGNOSIS — I10 PRIMARY HYPERTENSION: ICD-10-CM

## 2025-07-02 DIAGNOSIS — Z13.6 SCREENING FOR CARDIOVASCULAR CONDITION: ICD-10-CM

## 2025-07-02 LAB
ALBUMIN SERPL BCG-MCNC: 3.9 G/DL (ref 3.5–5)
ALP SERPL-CCNC: 70 U/L (ref 34–104)
ALT SERPL W P-5'-P-CCNC: 15 U/L (ref 7–52)
ANION GAP SERPL CALCULATED.3IONS-SCNC: 10 MMOL/L (ref 4–13)
AST SERPL W P-5'-P-CCNC: 17 U/L (ref 13–39)
BASOPHILS # BLD AUTO: 0.04 THOUSANDS/ÂΜL (ref 0–0.1)
BASOPHILS NFR BLD AUTO: 1 % (ref 0–1)
BILIRUB SERPL-MCNC: 0.5 MG/DL (ref 0.2–1)
BUN SERPL-MCNC: 9 MG/DL (ref 5–25)
CALCIUM SERPL-MCNC: 8.7 MG/DL (ref 8.4–10.2)
CHLORIDE SERPL-SCNC: 105 MMOL/L (ref 96–108)
CHOLEST SERPL-MCNC: 236 MG/DL (ref ?–200)
CO2 SERPL-SCNC: 26 MMOL/L (ref 21–32)
CREAT SERPL-MCNC: 0.63 MG/DL (ref 0.6–1.3)
EOSINOPHIL # BLD AUTO: 0.13 THOUSAND/ÂΜL (ref 0–0.61)
EOSINOPHIL NFR BLD AUTO: 3 % (ref 0–6)
ERYTHROCYTE [DISTWIDTH] IN BLOOD BY AUTOMATED COUNT: 13.2 % (ref 11.6–15.1)
EST. AVERAGE GLUCOSE BLD GHB EST-MCNC: 117 MG/DL
GFR SERPL CREATININE-BSD FRML MDRD: 95 ML/MIN/1.73SQ M
GLUCOSE P FAST SERPL-MCNC: 75 MG/DL (ref 65–99)
HBA1C MFR BLD: 5.7 %
HCT VFR BLD AUTO: 41.2 % (ref 34.8–46.1)
HDLC SERPL-MCNC: 80 MG/DL
HGB BLD-MCNC: 13.5 G/DL (ref 11.5–15.4)
IMM GRANULOCYTES # BLD AUTO: 0.01 THOUSAND/UL (ref 0–0.2)
IMM GRANULOCYTES NFR BLD AUTO: 0 % (ref 0–2)
LDLC SERPL CALC-MCNC: 140 MG/DL (ref 0–100)
LYMPHOCYTES # BLD AUTO: 2.21 THOUSANDS/ÂΜL (ref 0.6–4.47)
LYMPHOCYTES NFR BLD AUTO: 44 % (ref 14–44)
MCH RBC QN AUTO: 29.7 PG (ref 26.8–34.3)
MCHC RBC AUTO-ENTMCNC: 32.8 G/DL (ref 31.4–37.4)
MCV RBC AUTO: 91 FL (ref 82–98)
MONOCYTES # BLD AUTO: 0.4 THOUSAND/ÂΜL (ref 0.17–1.22)
MONOCYTES NFR BLD AUTO: 8 % (ref 4–12)
NEUTROPHILS # BLD AUTO: 2.17 THOUSANDS/ÂΜL (ref 1.85–7.62)
NEUTS SEG NFR BLD AUTO: 44 % (ref 43–75)
NONHDLC SERPL-MCNC: 156 MG/DL
NRBC BLD AUTO-RTO: 0 /100 WBCS
PLATELET # BLD AUTO: 252 THOUSANDS/UL (ref 149–390)
PMV BLD AUTO: 10.5 FL (ref 8.9–12.7)
POTASSIUM SERPL-SCNC: 3.4 MMOL/L (ref 3.5–5.3)
PROT SERPL-MCNC: 6.9 G/DL (ref 6.4–8.4)
RBC # BLD AUTO: 4.54 MILLION/UL (ref 3.81–5.12)
SODIUM SERPL-SCNC: 141 MMOL/L (ref 135–147)
T4 FREE SERPL-MCNC: 1.08 NG/DL (ref 0.61–1.12)
T4 SERPL-MCNC: 10.44 UG/DL (ref 6.09–12.23)
TRIGL SERPL-MCNC: 82 MG/DL (ref ?–150)
TSH SERPL DL<=0.05 MIU/L-ACNC: 3.07 UIU/ML (ref 0.45–4.5)
WBC # BLD AUTO: 4.96 THOUSAND/UL (ref 4.31–10.16)

## 2025-07-02 PROCEDURE — 84439 ASSAY OF FREE THYROXINE: CPT

## 2025-07-02 PROCEDURE — 85025 COMPLETE CBC W/AUTO DIFF WBC: CPT

## 2025-07-02 PROCEDURE — 80061 LIPID PANEL: CPT

## 2025-07-02 PROCEDURE — 83036 HEMOGLOBIN GLYCOSYLATED A1C: CPT

## 2025-07-02 PROCEDURE — 36415 COLL VENOUS BLD VENIPUNCTURE: CPT

## 2025-07-02 PROCEDURE — 80053 COMPREHEN METABOLIC PANEL: CPT

## 2025-07-02 PROCEDURE — 84436 ASSAY OF TOTAL THYROXINE: CPT

## 2025-07-02 PROCEDURE — 84443 ASSAY THYROID STIM HORMONE: CPT

## 2025-07-09 ENCOUNTER — OFFICE VISIT (OUTPATIENT)
Dept: FAMILY MEDICINE CLINIC | Facility: CLINIC | Age: 65
End: 2025-07-09
Payer: COMMERCIAL

## 2025-07-09 VITALS
WEIGHT: 203 LBS | HEART RATE: 67 BPM | OXYGEN SATURATION: 98 % | SYSTOLIC BLOOD PRESSURE: 150 MMHG | TEMPERATURE: 95.3 F | BODY MASS INDEX: 35.97 KG/M2 | HEIGHT: 63 IN | DIASTOLIC BLOOD PRESSURE: 90 MMHG

## 2025-07-09 DIAGNOSIS — Z12.11 SCREENING FOR COLORECTAL CANCER: ICD-10-CM

## 2025-07-09 DIAGNOSIS — Z12.12 SCREENING FOR COLORECTAL CANCER: ICD-10-CM

## 2025-07-09 DIAGNOSIS — R11.0 NAUSEA: ICD-10-CM

## 2025-07-09 DIAGNOSIS — R10.84 GENERALIZED ABDOMINAL PAIN: Primary | ICD-10-CM

## 2025-07-09 PROCEDURE — 99214 OFFICE O/P EST MOD 30 MIN: CPT | Performed by: PHYSICIAN ASSISTANT

## 2025-07-09 RX ORDER — OMEPRAZOLE 20 MG/1
20 CAPSULE, DELAYED RELEASE ORAL DAILY
Qty: 90 CAPSULE | Refills: 0 | Status: SHIPPED | OUTPATIENT
Start: 2025-07-09 | End: 2025-07-14 | Stop reason: SDUPTHER

## 2025-07-09 RX ORDER — ONDANSETRON 4 MG/1
4 TABLET, FILM COATED ORAL EVERY 8 HOURS PRN
Qty: 20 TABLET | Refills: 0 | Status: SHIPPED | OUTPATIENT
Start: 2025-07-09

## 2025-07-09 NOTE — PROGRESS NOTES
Name: Kayleigh Harry      : 1960      MRN: [id number]  Encounter Provider: Kaleb Du PA-C  Encounter Date: 2025   Encounter department: [facility] PRIMARY CARE  :  Assessment & Plan  Generalized abdominal pain     - Pt reports generalized abdominal pain, nausea, 1 episode of vomiting x 2 weeks    - Locates to the general upper region w/ radiation lower, described as aching, with associated symptoms of nausea, constipation.    - Patient mildly tender in LUQ, also w/ distention    - PCP to screen for infectious cause and collect KUB and treat as appropriate   - Provided prilosec 20mg QD for symptom relief- advised not to take until stool sample submitted  Orders:    omeprazole (PriLOSEC) 20 mg delayed release capsule; Take 1 capsule (20 mg total) by mouth daily    XR abdomen 1 view kub; Future    H. pylori antigen, stool; Future    Stool Enteric Bacterial Panel by PCR; Future    Nausea    Orders:    ondansetron (ZOFRAN) 4 mg tablet; Take 1 tablet (4 mg total) by mouth every 8 (eight) hours as needed for nausea or vomiting    Screening for colorectal cancer    Orders:    Ambulatory Referral to Gastroenterology; Future           History of Present Illness   Abdominal Pain  This is a new problem. Episode onset: 2 weeks ago. The problem has been waxing and waning. The pain is located in the periumbilical region. The pain is at a severity of 5/10. The quality of the pain is aching. The abdominal pain radiates to the suprapubic region. Associated symptoms include constipation, nausea and vomiting. Pertinent negatives include no diarrhea, dysuria, fever, headaches or hematuria. Associated symptoms comments: Decreased appetite. Treatments tried: homemade remedies. The treatment provided no relief.     Review of Systems   Constitutional:  Negative for fatigue and fever.   Respiratory:  Negative for cough and shortness of breath.    Cardiovascular:  Negative for chest pain.   Gastrointestinal:   "Positive for abdominal pain, constipation, nausea and vomiting. Negative for diarrhea.   Genitourinary:  Negative for dysuria and hematuria.   Neurological:  Negative for headaches.       Objective   /90 (BP Location: Left arm, Patient Position: Sitting, Cuff Size: Adult)   Pulse 67   Temp (!) 95.3 °F (35.2 °C) (Tympanic)   Ht 5' 3\" (1.6 m)   Wt 92.1 kg (203 lb)   SpO2 98%   BMI 35.96 kg/m²      Physical Exam  Constitutional:       Appearance: Normal appearance.   HENT:      Head: Normocephalic.      Right Ear: External ear normal.      Left Ear: External ear normal.      Nose: Nose normal.      Mouth/Throat:      Mouth: Mucous membranes are moist.      Pharynx: Oropharynx is clear.     Eyes:      Conjunctiva/sclera: Conjunctivae normal.       Cardiovascular:      Rate and Rhythm: Normal rate and regular rhythm.      Heart sounds: Normal heart sounds.   Pulmonary:      Effort: Pulmonary effort is normal.      Breath sounds: Normal breath sounds.   Abdominal:      General: Bowel sounds are normal. There is distension.      Palpations: Abdomen is soft.      Tenderness: There is abdominal tenderness in the left upper quadrant. There is no right CVA tenderness or left CVA tenderness.     Neurological:      Mental Status: She is alert and oriented to person, place, and time.     Psychiatric:         Behavior: Behavior normal.         "

## 2025-07-10 ENCOUNTER — APPOINTMENT (OUTPATIENT)
Dept: LAB | Facility: CLINIC | Age: 65
End: 2025-07-10
Attending: PHYSICIAN ASSISTANT
Payer: COMMERCIAL

## 2025-07-10 DIAGNOSIS — R10.84 GENERALIZED ABDOMINAL PAIN: ICD-10-CM

## 2025-07-10 PROCEDURE — 87338 HPYLORI STOOL AG IA: CPT

## 2025-07-10 PROCEDURE — 87505 NFCT AGENT DETECTION GI: CPT

## 2025-07-11 LAB
C COLI+JEJUNI TUF STL QL NAA+PROBE: NEGATIVE
EC STX1+STX2 GENES STL QL NAA+PROBE: NEGATIVE
H PYLORI AG STL QL IA: POSITIVE
SALMONELLA SP SPAO STL QL NAA+PROBE: NEGATIVE
SHIGELLA SP+EIEC IPAH STL QL NAA+PROBE: NEGATIVE

## 2025-07-14 ENCOUNTER — RESULTS FOLLOW-UP (OUTPATIENT)
Dept: FAMILY MEDICINE CLINIC | Facility: CLINIC | Age: 65
End: 2025-07-14

## 2025-07-14 ENCOUNTER — OFFICE VISIT (OUTPATIENT)
Dept: FAMILY MEDICINE CLINIC | Facility: CLINIC | Age: 65
End: 2025-07-14
Payer: COMMERCIAL

## 2025-07-14 ENCOUNTER — TELEPHONE (OUTPATIENT)
Dept: FAMILY MEDICINE CLINIC | Facility: CLINIC | Age: 65
End: 2025-07-14

## 2025-07-14 VITALS
DIASTOLIC BLOOD PRESSURE: 76 MMHG | WEIGHT: 202.8 LBS | OXYGEN SATURATION: 97 % | BODY MASS INDEX: 35.93 KG/M2 | TEMPERATURE: 96.9 F | HEART RATE: 96 BPM | SYSTOLIC BLOOD PRESSURE: 110 MMHG | HEIGHT: 63 IN

## 2025-07-14 DIAGNOSIS — R10.84 GENERALIZED ABDOMINAL PAIN: ICD-10-CM

## 2025-07-14 DIAGNOSIS — A04.8 H. PYLORI INFECTION: Primary | ICD-10-CM

## 2025-07-14 PROCEDURE — 99214 OFFICE O/P EST MOD 30 MIN: CPT | Performed by: PHYSICIAN ASSISTANT

## 2025-07-14 RX ORDER — BISMUTH SUBCITRATE POTASSIUM, METRONIDAZOLE, TETRACYCLINE HYDROCHLORIDE 140; 125; 125 MG/1; MG/1; MG/1
3 CAPSULE ORAL
Qty: 168 CAPSULE | Refills: 0 | Status: SHIPPED | OUTPATIENT
Start: 2025-07-14 | End: 2025-07-15 | Stop reason: CLARIF

## 2025-07-14 RX ORDER — OMEPRAZOLE 20 MG/1
20 CAPSULE, DELAYED RELEASE ORAL 2 TIMES DAILY
Qty: 28 CAPSULE | Refills: 0 | Status: SHIPPED | OUTPATIENT
Start: 2025-07-14 | End: 2025-07-28

## 2025-07-14 RX ORDER — BISMUTH SUBCITRATE POTASSIUM, METRONIDAZOLE, TETRACYCLINE HYDROCHLORIDE 140; 125; 125 MG/1; MG/1; MG/1
3 CAPSULE ORAL
Qty: 120 CAPSULE | Refills: 0 | Status: SHIPPED | OUTPATIENT
Start: 2025-07-14 | End: 2025-07-14

## 2025-07-14 NOTE — PROGRESS NOTES
Name: Kayleigh Harry      : 1960      MRN: [id number]  Encounter Provider: Kaleb Du PA-C  Encounter Date: 2025   Encounter department: [facility] PRIMARY CARE  :  Assessment & Plan  H. pylori infection     - Pt seen in office last week for GI symptoms such as abdominal pain, nausea, vomiting and had testing performed and was positive for h.pylori    - Will rx pyerla QID therapy x 14 days along with BID PPI omeprazole    - Pt was advised that therapy is intense and can reproduce GI symptoms but was advised to tolerate therapy to fully resolve infection    - Pt to call office if unable to tolerate   - Pt to return in 6 weeks for annual physical and will test for resolution of infection   Orders:    bismuth-metronidazole-tetracycline (PYLERA) 140-125-125 MG per capsule; Take 3 capsules by mouth 4 (four) times a day (before meals and at bedtime) for 14 days    Generalized abdominal pain    Orders:    omeprazole (PriLOSEC) 20 mg delayed release capsule; Take 1 capsule (20 mg total) by mouth in the morning and 1 capsule (20 mg total) before bedtime. Do all this for 14 days.           History of Present Illness   Abdominal Pain  This is a recurrent problem. The current episode started 1 to 4 weeks ago. The onset quality is gradual. The problem occurs intermittently. The problem has been waxing and waning. The pain is located in the epigastric region. The pain is at a severity of 5/10. The quality of the pain is aching. Associated symptoms include nausea and vomiting. Pertinent negatives include no constipation, diarrhea, fever, headaches or weight loss. Nothing aggravates the pain. The pain is relieved by Nothing. She has tried nothing for the symptoms.     Review of Systems   Constitutional:  Negative for fatigue, fever and weight loss.   Respiratory:  Negative for cough and shortness of breath.    Cardiovascular:  Negative for chest pain.   Gastrointestinal:  Positive for abdominal pain,  "nausea and vomiting. Negative for constipation and diarrhea.   Neurological:  Negative for headaches.       Objective   /76 (BP Location: Left arm, Patient Position: Sitting)   Pulse 96   Temp (!) 96.9 °F (36.1 °C) (Tympanic)   Ht 5' 3\" (1.6 m)   Wt 92 kg (202 lb 12.8 oz)   SpO2 97%   BMI 35.92 kg/m²      Physical Exam  Constitutional:       Appearance: Normal appearance.   HENT:      Head: Normocephalic.      Right Ear: External ear normal.      Left Ear: External ear normal.      Nose: Nose normal.      Mouth/Throat:      Mouth: Mucous membranes are moist.      Pharynx: Oropharynx is clear.     Eyes:      Conjunctiva/sclera: Conjunctivae normal.       Cardiovascular:      Rate and Rhythm: Normal rate and regular rhythm.      Heart sounds: Normal heart sounds.   Pulmonary:      Effort: Pulmonary effort is normal.      Breath sounds: Normal breath sounds.   Abdominal:      General: Bowel sounds are normal.      Palpations: Abdomen is soft.     Neurological:      Mental Status: She is alert and oriented to person, place, and time.     Psychiatric:         Behavior: Behavior normal.         "

## 2025-07-14 NOTE — TELEPHONE ENCOUNTER
Patient aware and scheduled for appointment for treatment     ----- Message from Kaleb Du PA-C sent at 7/13/2025  6:30 PM EDT -----  Hey Ladies,     Please call patient and inform her that she is positive for H.pylori infection, likely causing her abdominal symptoms. Please also place her on my schedule so we can prescribe ABX therapy to correct   this infection.     Thank you !  Kaleb   ----- Message -----  From: Lab, Background User  Sent: 7/11/2025  12:06 PM EDT  To: Kaleb Du PA-C

## 2025-07-14 NOTE — TELEPHONE ENCOUNTER
Prior auth needed for bismuth-metronidazole-tetracycline (PYLERA) 140-125-125 MG per capsule \    CoverMyMeds     Key: [id number]

## 2025-07-15 DIAGNOSIS — A04.8 H. PYLORI INFECTION: Primary | ICD-10-CM

## 2025-07-15 RX ORDER — TETRACYCLINE HYDROCHLORIDE 250 MG/1
250 CAPSULE ORAL 4 TIMES DAILY
Qty: 56 CAPSULE | Refills: 0 | Status: SHIPPED | OUTPATIENT
Start: 2025-07-15 | End: 2025-07-17 | Stop reason: CLARIF

## 2025-07-15 RX ORDER — METRONIDAZOLE 500 MG/1
500 TABLET ORAL EVERY 8 HOURS SCHEDULED
Qty: 42 TABLET | Refills: 0 | Status: SHIPPED | OUTPATIENT
Start: 2025-07-15 | End: 2025-07-29

## 2025-07-15 RX ORDER — BISMUTH SUBSALICYLATE 262 MG/1
524 TABLET, CHEWABLE ORAL
Qty: 112 TABLET | Refills: 0 | Status: SHIPPED | OUTPATIENT
Start: 2025-07-15 | End: 2025-07-29

## 2025-07-16 ENCOUNTER — TELEPHONE (OUTPATIENT)
Dept: FAMILY MEDICINE CLINIC | Facility: CLINIC | Age: 65
End: 2025-07-16

## 2025-07-16 DIAGNOSIS — A04.8 H. PYLORI INFECTION: ICD-10-CM

## 2025-07-16 RX ORDER — TETRACYCLINE HYDROCHLORIDE 250 MG/1
250 CAPSULE ORAL 4 TIMES DAILY
Qty: 56 CAPSULE | Refills: 0 | Status: CANCELLED | OUTPATIENT
Start: 2025-07-16 | End: 2025-07-30

## 2025-07-16 NOTE — TELEPHONE ENCOUNTER
Prior Auth is needed. I spoke with the Pharmacy and the Pharmacist reopened the prior auth for TetracyclineHCL 250 MG capsules.

## 2025-07-17 ENCOUNTER — TELEPHONE (OUTPATIENT)
Age: 65
End: 2025-07-17

## 2025-07-17 DIAGNOSIS — A04.8 H. PYLORI INFECTION: Primary | ICD-10-CM

## 2025-07-17 RX ORDER — AMOXICILLIN 500 MG/1
1000 CAPSULE ORAL EVERY 8 HOURS SCHEDULED
Qty: 84 CAPSULE | Refills: 0 | Status: SHIPPED | OUTPATIENT
Start: 2025-07-17 | End: 2025-07-31

## 2025-07-17 NOTE — TELEPHONE ENCOUNTER
Breezy Sifuentes,     Please call patient and advise that I have substituted tetracycline (the expensive ABX) and replaced it with high dose amoxicillin. This should be covered by her insurance and is to be taken for 14 days, 1000mg three times a day.     Please apologize on my behalf for the inconvenience this has caused, and if she has any questions please let me know.     Kaleb

## 2025-07-17 NOTE — TELEPHONE ENCOUNTER
Called patient to let her know provider sent medication individually since insurance denied combination pill and to take as prescribed. No answer, left voicemail to call office back.

## 2025-07-17 NOTE — TELEPHONE ENCOUNTER
Called patient and spoke to patient's spouse. Let him know medication amoxicillin was covered by insurance and sent to pharmacy. Advised patient takes medication as prescribed.

## 2025-07-17 NOTE — TELEPHONE ENCOUNTER
Patient called and through interpretor [id number], patient said that the antibiotic that was prescribed yesterday was too expensive.  She could not tell me if it was the tetracycline (ACHROMYCIN,SUMYCIN) 250 MG capsule or metroNIDAZOLE (FLAGYL) 500 mg tablet.    New medication is going to [facility] [address] [place] [address] AIRPORT [phone].     Please advise, thank you.

## 2025-07-21 NOTE — TELEPHONE ENCOUNTER
PA for tetracycline (ACHROMYCIN,SUMYCIN) 250 MG  APPROVED     Date(s) approved 7/17/25 - 7/17/26 - All strengths are approved    Case #[id number]    Medication was cancelled by provider       Pharmacy advised by    [x]Fax  []Phone call  []Secure Chat         Approval letter scanned into Media Yes      
PA for tetracycline (ACHROMYCIN,SUMYCIN) 250 MG SUBMITTED to     via    [x][facility]-KEY: [id number]  []Surescripts-Case ID #   []Availity-Auth ID # NDC #   []Faxed to plan   []Other website   []Phone call Case ID #     [x]PA sent as URGENT    All office notes, labs and other pertaining documents and studies sent. Clinical questions answered. Awaiting determination from insurance company.     Turnaround time for your insurance to make a decision on your Prior Authorization can take 7-21 business days.                 
No

## 2025-07-21 NOTE — TELEPHONE ENCOUNTER
[id number]:  Called patient to inform her that insurance denied combination pill and individual medications were sent to pharmacy. Patient did not answer and unable to leave a voicemail. Attempted to call alternative number, left a voicemail to call the office at [phone].

## 2025-08-07 ENCOUNTER — APPOINTMENT (OUTPATIENT)
Dept: LAB | Facility: CLINIC | Age: 65
End: 2025-08-07
Payer: COMMERCIAL

## 2025-08-07 DIAGNOSIS — A04.8 H. PYLORI INFECTION: Primary | ICD-10-CM

## 2025-08-11 ENCOUNTER — RESULTS FOLLOW-UP (OUTPATIENT)
Dept: FAMILY MEDICINE CLINIC | Facility: CLINIC | Age: 65
End: 2025-08-11